# Patient Record
Sex: FEMALE | Race: WHITE | NOT HISPANIC OR LATINO | Employment: OTHER | URBAN - METROPOLITAN AREA
[De-identification: names, ages, dates, MRNs, and addresses within clinical notes are randomized per-mention and may not be internally consistent; named-entity substitution may affect disease eponyms.]

---

## 2017-01-11 ENCOUNTER — OFFICE VISIT (OUTPATIENT)
Dept: PULMONOLOGY | Facility: HOSPICE | Age: 72
End: 2017-01-11
Payer: MEDICARE

## 2017-01-11 VITALS
OXYGEN SATURATION: 97 % | WEIGHT: 140 LBS | BODY MASS INDEX: 28.22 KG/M2 | HEART RATE: 72 BPM | TEMPERATURE: 97.5 F | SYSTOLIC BLOOD PRESSURE: 120 MMHG | HEIGHT: 59 IN | RESPIRATION RATE: 16 BRPM | DIASTOLIC BLOOD PRESSURE: 80 MMHG

## 2017-01-11 VITALS — WEIGHT: 140 LBS | HEIGHT: 59 IN | BODY MASS INDEX: 28.22 KG/M2

## 2017-01-11 DIAGNOSIS — J45.30 REACTIVE AIRWAY DISEASE, MILD PERSISTENT, UNCOMPLICATED: ICD-10-CM

## 2017-01-11 DIAGNOSIS — J43.9 PULMONARY EMPHYSEMA, UNSPECIFIED EMPHYSEMA TYPE (HCC): ICD-10-CM

## 2017-01-11 DIAGNOSIS — R05.9 COUGH: ICD-10-CM

## 2017-01-11 PROCEDURE — 99213 OFFICE O/P EST LOW 20 MIN: CPT | Performed by: NURSE PRACTITIONER

## 2017-01-11 ASSESSMENT — PULMONARY FUNCTION TESTS
FEV1_PERCENT_PREDICTED: 104
FEV1/FVC_PERCENT_CHANGE: 50
FEV1_PERCENT_CHANGE: 2
FVC: 2.31
FEV1/FVC: 82.25
FEV1_PERCENT_PREDICTED: 102
FEV1_PERCENT_CHANGE: 1
FEV1/FVC_PERCENT_PREDICTED: 76
FEV1_PREDICTED: 1.82
FEV1: 1.86
FVC_PERCENT_PREDICTED: 96
FEV1/FVC_PERCENT_PREDICTED: 108
FEV1/FVC: 82
FVC: 2.27
FVC_PREDICTED: 2.41
FVC_PERCENT_PREDICTED: 94
FEV1/FVC_PERCENT_PREDICTED: 109
FEV1: 1.9

## 2017-01-11 NOTE — MR AVS SNAPSHOT
"Cait Pierre   2017 4:00 PM   Office Visit   MRN: [id number]    Department:  [facility]   Dept Phone:  [phone]    Description:  Female : 1945   Provider:  JOSTIN Mena           Reason for Visit     Follow-Up PFT Results      Allergies as of 2017     Allergen Noted Reactions    Levaquin 2012       Headache, Dizzy      You were diagnosed with     Cough   [786.2.ICD-9-CM]       Reactive airway disease, mild persistent, uncomplicated   [0798727]         Vital Signs     Blood Pressure Pulse Temperature Respirations Height Weight    120/80 mmHg 72 36.4 °C (97.5 °F) 16 1.499 m (4' 11.02\") 63.504 kg (140 lb)    Body Mass Index Oxygen Saturation Last Menstrual Period Smoking Status          28.26 kg/m2 97% 2001 Former Smoker        Basic Information     Date Of Birth Sex Race Ethnicity Preferred Language    1945 Female White Non- English      Your appointments     2017  9:20 AM   Established Patient with Birgit Gale M.D.   [facility] (--)    [address]  [address]  [facility] [address] [phone]           You will be receiving a confirmation call a few days before your appointment from our automated call confirmation system.            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   2016 - Present    Pulmonary emphysema (HCC) J43.9   2016 - Present    Cough R05   2016 - Present   " Reactive airway disease J45.909   11/29/2016 - Present      Health Maintenance        Date Due Completion Dates    IMM ZOSTER VACCINE 3/15/2007 ---    IMM INFLUENZA (1) 9/1/2016 10/12/2015 (Declined)    Override on 10/12/2015: Patient Declined    MAMMOGRAM 10/22/2016 10/22/2015, 10/14/2014, 10/8/2013, 10/8/2013 (Done), 5/29/2012, 5/29/2012 (N/S)    Override on 10/8/2013: Done    Override on 5/29/2012: (N/S)    BONE DENSITY 11/3/2020 11/3/2015, 12/31/2013, 5/29/2012, 5/29/2012 (N/S)    Override on 5/29/2012: (N/S)    IMM DTaP/Tdap/Td Vaccine (2 - Td) 6/8/2022 6/8/2012    COLONOSCOPY 9/17/2022 9/17/2012, 9/17/2012 (N/S)    Override on 9/17/2012: (N/S)            Current Immunizations     13-VALENT PCV PREVNAR 10/12/2015    Pneumococcal polysaccharide vaccine (PPSV-23) 6/8/2012    Tdap Vaccine 6/8/2012      Below and/or attached are the medications your provider expects you to take. Review all of your home medications and newly ordered medications with your provider and/or pharmacist. Follow medication instructions as directed by your provider and/or pharmacist. Please keep your medication list with you and share with your provider. Update the information when medications are discontinued, doses are changed, or new medications (including over-the-counter products) are added; and carry medication information at all times in the event of emergency situations     Allergies:  LEVAQUIN - (reactions not documented)               Medications  Valid as of: January 11, 2017 -  4:16 PM    Generic Name Brand Name Tablet Size Instructions for use    Albuterol Sulfate (Aero Soln) albuterol 108 (90 BASE) MCG/ACT Inhale 2 Puffs by mouth every 6 hours as needed for Shortness of Breath.        Amoxicillin (Recon Susp) AMOXIL 250 MG/5ML Take 10 mL by mouth 2 times a day.        Beclomethasone Dipropionate (Aero Soln) QVAR 80 MCG/ACT Inhale 2 Puffs by mouth 2 times a day. Use spacer. Rinse mouth after each use.         "Hydrocodone-Acetaminophen (Solution) HYCET 7.5-325 MG/15ML Take 14.7 mL by mouth every four hours as needed.        Hydrocortisone (Cream) PROCTOZONE HC 2.5 % Apply to perirectal area twice daily.        NON SPECIFIED   \"Antibiotics\" for strep        NON SPECIFIED   every day. Blue green algae        Probiotic Product   Take  by mouth every day.        Thyroid (Tab) ARMOUR THYROID 60 MG TAKE ONE TABLET BY MOUTH ONCE DAILY        .                 Medicines prescribed today were sent to:     [facility] [address] [place] [address] [address] [place], [place] [address] [facility] [address]    Phone: [phone] Fax: [phone]    Open 24 Hours?: No      Medication refill instructions:       If your prescription bottle indicates you have medication refills left, it is not necessary to call your provider’s office. Please contact your pharmacy and they will refill your medication.    If your prescription bottle indicates you do not have any refills left, you may request refills at any time through one of the following ways: The online Axxana system (except Urgent Care), by calling your provider’s office, or by asking your pharmacy to contact your provider’s office with a refill request. Medication refills are processed only during regular business hours and may not be available until the next business day. Your provider may request additional information or to have a follow-up visit with you prior to refilling your medication.   *Please Note: Medication refills are assigned a new Rx number when refilled electronically. Your pharmacy may indicate that no refills were authorized even though a new prescription for the same medication is available at the pharmacy. Please request the medicine by name with the pharmacy before contacting your provider for a refill.        Instructions    1. Stop Spiriva.  2. [place] Qvar 80 µg 2 inhalations twice daily with spacer. Rinse mouth thoroughly after use.  3. Continue " Ventolin 2 puffs every 4 hours as needed for cough.         Other Notes About Your Plan     Cait is a R-Med Team patient of Dr. Gonzalez.    This appointment is 3-7-16. It is the beginning of the year and will require all chronic conditions to be assessed and documented in conjunction with any other identified concerns during the visit:    No Query           Chewse Access Code: [email or web address]  Expires: 1/17/2017  8:14 AM    Chewse  A secure, online tool to manage your health information     [employer]’s Chewse® is a secure, online tool that connects you to your personalized health information from the privacy of your home -- day or night - making it very easy for you to manage your healthcare. Once the activation process is completed, you can even access your medical information using the Chewse debra, which is available for free in the Apple Debra store or Google Play store.     Chewse provides the following levels of access (as shown below):   My Chart Features   [facility] Primary Care Doctor [facility]  Specialists [facility]  Urgent  Care [facility]  Primary Care  Doctor   Email your healthcare team securely and privately 24/7 X X X    Manage appointments: schedule your next appointment; view details of past/upcoming appointments X      Request prescription refills. X      View recent personal medical records, including lab and immunizations X X X X   View health record, including health history, allergies, medications X X X X   Read reports about your outpatient visits, procedures, consult and ER notes X X X X   See your discharge summary, which is a recap of your hospital and/or ER visit that includes your diagnosis, lab results, and care plan. X X       How to register for Chewse:  1. Go to  [email or web address].  2. Click on the Sign Up Now box, which takes you to the New Member Sign Up page. You will need to provide the following information:  a. Enter your Chewse Access Code exactly as it appears at the  top of this page. (You will not need to use this code after you’ve completed the sign-up process. If you do not sign up before the expiration date, you must request a new code.)   b. Enter your date of birth.   c. Enter your home email address.   d. Click Submit, and follow the next screen’s instructions.  3. Create a HYLA Mobile ID. This will be your HYLA Mobile login ID and cannot be changed, so think of one that is secure and easy to remember.  4. Create a HYLA Mobile password. You can change your password at any time.  5. Enter your Password Reset Question and Answer. This can be used at a later time if you forget your password.   6. Enter your e-mail address. This allows you to receive e-mail notifications when new information is available in HYLA Mobile.  7. Click Sign Up. You can now view your health information.    For assistance activating your HYLA Mobile account, call [phone]

## 2017-01-11 NOTE — MR AVS SNAPSHOT
"Cait SalamancaBrennaSteven   2017 3:00 PM   Office Visit   MRN: [id number]    Department:  [facility]   Dept Phone:  [phone]    Description:  Female : 1945   Provider:  [id number]           Allergies as of 2017     Allergen Noted Reactions    Levaquin 2012       Headache, Dizzy      You were diagnosed with     Cough   [786.2.ICD-9-CM]       Reactive airway disease, mild persistent, uncomplicated   [7047222]       Pulmonary emphysema, unspecified emphysema type (HCC)   [7183307]         Vital Signs     Height Weight Body Mass Index Last Menstrual Period Smoking Status       1.499 m (4' 11\") 63.504 kg (140 lb) 28.26 kg/m2 2001 Former Smoker       Basic Information     Date Of Birth Sex Race Ethnicity Preferred Language    1945 Female White Non- English      Your appointments     2017  4:00 PM   Established Patient Pul with JOSTIN Mena   [facility] (--)    [address] [place] [id number] [phone]            2017  9:20 AM   Established Patient with Birgit Gale M.D.   [facility] (--)    [address]  [address]  [facility] [address] [phone]           You will be receiving a confirmation call a few days before your appointment from our automated call confirmation system.            Mar 02, 2017  9:20 AM   XRAY 15 with PULMONARY DX 1   [facility] ([address])    [address] [place] [id number]               Mar 02, 2017 10:00 AM   Pulmonary Function Test with PFT-RM2   [facility] (--)    [address] [place] [id number] [phone]              Problem List              ICD-10-CM Priority Class Noted - Resolved    Hyperlipidemia with target LDL less than 130 E78.5   2012 - Present    Osteopenia M85.80   2012 - Present    H/O partial thyroidectomy E89.0   Unknown - Present    Hypothyroidism E03.9   " 12/18/2012 - Present    Multiple thyroid nodules E04.2   Unknown - Present    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   9/9/2016 - Present    Pulmonary emphysema (HCC) J43.9   11/29/2016 - Present    Cough R05   11/29/2016 - Present    Reactive airway disease J45.909   11/29/2016 - Present      Health Maintenance        Date Due Completion Dates    IMM ZOSTER VACCINE 3/15/2007 ---    IMM INFLUENZA (1) 9/1/2016 10/12/2015 (Declined)    Override on 10/12/2015: Patient Declined    MAMMOGRAM 10/22/2016 10/22/2015, 10/14/2014, 10/8/2013, 10/8/2013 (Done), 5/29/2012, 5/29/2012 (N/S)    Override on 10/8/2013: Done    Override on 5/29/2012: (N/S)    BONE DENSITY 11/3/2020 11/3/2015, 12/31/2013, 5/29/2012, 5/29/2012 (N/S)    Override on 5/29/2012: (N/S)    IMM DTaP/Tdap/Td Vaccine (2 - Td) 6/8/2022 6/8/2012    COLONOSCOPY 9/17/2022 9/17/2012, 9/17/2012 (N/S)    Override on 9/17/2012: (N/S)            Current Immunizations     13-VALENT PCV PREVNAR 10/12/2015    Pneumococcal polysaccharide vaccine (PPSV-23) 6/8/2012    Tdap Vaccine 6/8/2012      Below and/or attached are the medications your provider expects you to take. Review all of your home medications and newly ordered medications with your provider and/or pharmacist. Follow medication instructions as directed by your provider and/or pharmacist. Please keep your medication list with you and share with your provider. Update the information when medications are discontinued, doses are changed, or new medications (including over-the-counter products) are added; and carry medication information at all times in the event of emergency situations     Allergies:  LEVAQUIN - (reactions not documented)               Medications  Valid as of: January 11, 2017 -  3:39 PM    Generic Name Brand Name Tablet Size Instructions for use    Albuterol Sulfate (Aero Soln) albuterol 108 (90 BASE) MCG/ACT Inhale 2 Puffs by mouth every 6 hours as needed for Shortness of Breath.        Amoxicillin (Recon Susp)  "AMOXIL 250 MG/5ML Take 10 mL by mouth 2 times a day.        Hydrocodone-Acetaminophen (Solution) HYCET 7.5-325 MG/15ML Take 14.7 mL by mouth every four hours as needed.        Hydrocortisone (Cream) PROCTOZONE HC 2.5 % Apply to perirectal area twice daily.        NON SPECIFIED   \"Antibiotics\" for strep        NON SPECIFIED   every day. Blue green algae        Probiotic Product   Take  by mouth every day.        Thyroid (Tab) ARMOUR THYROID 60 MG TAKE ONE TABLET BY MOUTH ONCE DAILY        Tiotropium Bromide Monohydrate (Cap) SPIRIVA 18 MCG Use 1 capsule inhaled from handihaler every day.        .                 Medicines prescribed today were sent to:     [facility] [address] [place] [address] [address] [place] [address]    Phone: [phone] Fax: [phone]    Open 24 Hours?: No      Medication refill instructions:       If your prescription bottle indicates you have medication refills left, it is not necessary to call your provider’s office. Please contact your pharmacy and they will refill your medication.    If your prescription bottle indicates you do not have any refills left, you may request refills at any time through one of the following ways: The online Streaming Era system (except Urgent Care), by calling your provider’s office, or by asking your pharmacy to contact your provider’s office with a refill request. Medication refills are processed only during regular business hours and may not be available until the next business day. Your provider may request additional information or to have a follow-up visit with you prior to refilling your medication.   *Please Note: Medication refills are assigned a new Rx number when refilled electronically. Your pharmacy may indicate that no refills were authorized even though a new prescription for the same medication is available at the pharmacy. Please request the medicine by name with the pharmacy before contacting your provider for " a refill.        Other Notes About Your Plan     Cait is a R-Med Team patient of Dr. Gonzalez.    This appointment is 3-7-16. It is the beginning of the year and will require all chronic conditions to be assessed and documented in conjunction with any other identified concerns during the visit:    No Query           Adstrix Access Code: [email or web address]  Expires: 1/17/2017  8:14 AM    Adstrix  A secure, online tool to manage your health information     [employer]’s Adstrix® is a secure, online tool that connects you to your personalized health information from the privacy of your home -- day or night - making it very easy for you to manage your healthcare. Once the activation process is completed, you can even access your medical information using the Adstrix debra, which is available for free in the Apple Debra store or Google Play store.     Adstrix provides the following levels of access (as shown below):   My Chart Features   [facility] Primary Care Doctor [facility]  Specialists [facility]  Urgent  Care [facility]  Primary Care  Doctor   Email your healthcare team securely and privately 24/7 X X X    Manage appointments: schedule your next appointment; view details of past/upcoming appointments X      Request prescription refills. X      View recent personal medical records, including lab and immunizations X X X X   View health record, including health history, allergies, medications X X X X   Read reports about your outpatient visits, procedures, consult and ER notes X X X X   See your discharge summary, which is a recap of your hospital and/or ER visit that includes your diagnosis, lab results, and care plan. X X       How to register for Adstrix:  1. Go to  [email or web address].  2. Click on the Sign Up Now box, which takes you to the New Member Sign Up page. You will need to provide the following information:  a. Enter your Adstrix Access Code exactly as it appears at the top of this page. (You will not need to use  this code after you’ve completed the sign-up process. If you do not sign up before the expiration date, you must request a new code.)   b. Enter your date of birth.   c. Enter your home email address.   d. Click Submit, and follow the next screen’s instructions.  3. Create a GradeBeamt ID. This will be your GradeBeamt login ID and cannot be changed, so think of one that is secure and easy to remember.  4. Create a GradeBeamt password. You can change your password at any time.  5. Enter your Password Reset Question and Answer. This can be used at a later time if you forget your password.   6. Enter your e-mail address. This allows you to receive e-mail notifications when new information is available in ShopSuey.  7. Click Sign Up. You can now view your health information.    For assistance activating your ShopSuey account, call [phone]

## 2017-01-11 NOTE — PROCEDURES
Technician: CHRIS Eagle    Technician Comment:  Good patient effort & cooperation.  The results of this test meet the ATS/ERS standards for acceptability & reproducibility.  Test was performed on the PowerOne Media Body Plethysmograph-Elite DX system.  Predicted values were [facility]-3 for spirometry, [facility] for DLCO, ITS for Lung Volumes.  The DLCO was uncorrected for Hgb.  A bronchodilator of Ventolin HFA -2puffs via spacer administered.    Interpretation:  SPIROMETRY:  1. FVC was 94% of predicted  2. FEV1 was 102% of predicted  3. FEV1/FVC ratio was 82%  4. There was no response to bronchodilators  LUNG VOLUMES:  1. Residual volume was 157% of predicted. Total lung capacity was 114% of predicted      DIFFUSION CAPACITY:  1. Diffusion capacity was mildly increased at 134% predicted      IMPRESSION:  Other than mild air trapping, and mild increase in diffusion capacity, probably secondary to that. The patient has normal pulmonary function test. Clinical correlation is required.

## 2017-01-11 NOTE — PROGRESS NOTES
Chief Complaint   Patient presents with   • Follow-Up     PFT Results         HPI:  This is a 71 y.o. female with a history of asthma and chronic cough. Pulmonary function tests from 1/11/17 indicate FEV1 1.86 L, 102% predicted, FEV1/FVC 82%, FEF 25-75% 120% predicted, and DLCO 134% predicted. The patient is compliant with Spiriva one inhalation daily. Chest x-ray from 11/3/16 indicates possible emphysema. The patient has a remote smoking history and quit in 1987 with significant secondhand smoke as she worked in [employer] for 30 years. The patient reports that her cough is worse with cold weather. She also notices with her first bite of food sometimes she has a coughing spasm, however it is not every time. The patient was using Ventolin twice daily and felt it was very effective in treating her cough. At her last visit she started Spiriva one time daily and used Ventolin one time daily and felt her cough worsened. She reports no dyspnea, fever, chills, hemoptysis, or wheezing.    Past Medical History   Diagnosis Date   • Hyperlipidemia    • Osteopenia    • H/O partial thyroidectomy 2012      L lobectomy / nodular hyperplasia   • Hypothyroidism 12/18/2012     TSH = 13.7 / 2014   • Multiple thyroid nodules      right thyroid lobe / 2 nodules   • Dental disorder      upper dentures    • Bronchitis      as a child   • Anesthesia      PONV    • Cold 8/3/16     Strep    • Tonsillitis        Past Surgical History   Procedure Laterality Date   • Other orthopedic surgery  1977     Partial Finger tip amputation from accident   • Colonoscopy  9/12   • Thyroidectomy  9/28/2012     Performed by Brent Silva M.D. at SURGERY SAME DAY [facility] ORS   • Microlaryngoscopy N/A 9/9/2016     Procedure: MICROLARYNGOSCOPY - DIRECT;  Surgeon: Brent Silva M.D.;  Location: SURGERY SAME DAY [facility];  Service:    • Vocal cord stripping Right 9/9/2016     Procedure: VOCAL CORD STRIPPING - TRUE;  Surgeon: Brent Silva M.D.;  " Location: SURGERY SAME DAY [facility];  Service:    • Lesion excision general Left 9/9/2016     Procedure: LESION EXCISION GENERAL - TONGUE BASE;  Surgeon: Brent Silva M.D.;  Location: SURGERY SAME DAY [facility];  Service:        Social History   Substance Use Topics   • Smoking status: Former Smoker -- 0.50 packs/day for 26 years     Types: Cigarettes     Quit date: 01/01/1986   • Smokeless tobacco: Never Used   • Alcohol Use: 0.6 oz/week     1 Cans of beer per week       ROS:   Constitutional: Denies fevers, chills, sweats, fatigue, and weight loss.  Eyes: Glasses.  Ears/nose/mouth/throat: Denies injury.  Cardiovascular: Denies chest pain, tightness.  Respiratory: See history of present illness.  GI: Denies heartburn, difficulty swallowing, nausea, and vomiting.  Neurological: Denies frequent headaches, dizziness, weakness.    Vitals:  Filed Vitals:    01/11/17 1542   Height: 1.499 m (4' 11.02\")   Weight: 63.504 kg (140 lb)   Weight % change since last entry.: 0 %   BP: 120/80   Pulse: 72   BMI (Calculated): 28.26   Resp: 16   Temp: 36.4 °C (97.5 °F)   O2 sat % room air: 97 %       Allergies:  Levaquin    Medications:  Current Outpatient Prescriptions   Medication Sig Dispense Refill   • beclomethasone (QVAR) 80 MCG/ACT inhaler Inhale 2 Puffs by mouth 2 times a day. Use spacer. Rinse mouth after each use. 1 Inhaler 5   • albuterol 108 (90 BASE) MCG/ACT Aero Soln inhalation aerosol Inhale 2 Puffs by mouth every 6 hours as needed for Shortness of Breath. 8.5 g 2   • hydrocortisone rectal (ANUSOL-HC) 2.5 % Cream Apply to perirectal area twice daily. 30 g 1   • Probiotic Product (PROBIOTIC DAILY PO) Take  by mouth every day.     • ARMOUR THYROID 60 MG Tab TAKE ONE TABLET BY MOUTH ONCE DAILY 30 Tab 6   • amoxicillin (AMOXIL) 250 MG/5ML Recon Susp Take 10 mL by mouth 2 times a day. 200 mL 0   • hydrocodone-acetaminophen 2.5-108 mg/5mL (HYCET) 7.5-325 MG/15ML solution Take 14.7 mL by mouth every four hours " "as needed. (Patient not taking: Reported on 11/29/2016) 500 mL 0   • NON SPECIFIED \"Antibiotics\" for strep     • NON SPECIFIED every day. Blue green algae       No current facility-administered medications for this visit.       PHYSICAL EXAM:  Appearance: Well-developed, well-nourished, no acute distress.  Eyes. PERRL.  Hearing: Grossly intact.  Oropharynx: Tongue normal, posterior pharynx without erythema or exudate.  Respiratory effort: No intercostal retractions or use of accessory muscles.  Lung auscultation: No crackles, wheezing.  Heart auscultation: No murmur, gallop, or rub. Regular rate and rhythm.  Extremities: No cyanosis or edema.  Gait and Station: Normal  Orientation: Oriented to time, place, and person.    Assessment:  1. Cough  beclomethasone (QVAR) 80 MCG/ACT inhaler   2. Reactive airway disease, mild persistent, uncomplicated  beclomethasone (QVAR) 80 MCG/ACT inhaler         Plan:  1. Stop Spiriva.  2. [place] Qvar 80 µg 2 inhalations twice daily with spacer. Rinse mouth thoroughly after use.  3. Continue Ventolin 2 puffs every 4 hours as needed for cough.    Return in about 2 months (around 3/11/2017) for With SHYANNE Colon.        "

## 2017-01-12 NOTE — PATIENT INSTRUCTIONS
1. Stop Spiriva.  2. [place] Qvar 80 µg 2 inhalations twice daily with spacer. Rinse mouth thoroughly after use.  3. Continue Ventolin 2 puffs every 4 hours as needed for cough.

## 2017-01-16 ENCOUNTER — OFFICE VISIT (OUTPATIENT)
Dept: MEDICAL GROUP | Facility: PHYSICIAN GROUP | Age: 72
End: 2017-01-16
Payer: MEDICARE

## 2017-01-16 VITALS
SYSTOLIC BLOOD PRESSURE: 120 MMHG | OXYGEN SATURATION: 99 % | DIASTOLIC BLOOD PRESSURE: 72 MMHG | HEIGHT: 59 IN | RESPIRATION RATE: 16 BRPM | WEIGHT: 142 LBS | BODY MASS INDEX: 28.63 KG/M2 | HEART RATE: 97 BPM | TEMPERATURE: 98.6 F

## 2017-01-16 DIAGNOSIS — R73.01 IMPAIRED FASTING BLOOD SUGAR: ICD-10-CM

## 2017-01-16 DIAGNOSIS — R05.3 CHRONIC COUGH: ICD-10-CM

## 2017-01-16 DIAGNOSIS — M85.80 OSTEOPENIA: ICD-10-CM

## 2017-01-16 DIAGNOSIS — E78.5 DYSLIPIDEMIA: ICD-10-CM

## 2017-01-16 DIAGNOSIS — E03.9 HYPOTHYROIDISM, UNSPECIFIED TYPE: ICD-10-CM

## 2017-01-16 PROCEDURE — 99214 OFFICE O/P EST MOD 30 MIN: CPT | Performed by: FAMILY MEDICINE

## 2017-01-16 NOTE — PROGRESS NOTES
"Subjective:      Cait Pierre is a 71 y.o. female who presents with Follow-Up            HPI     Patient returns for follow-up of her medical problems. She is accompanied by her .    In terms of her chronic cough, associated evaluated by the pulmonologist. She had pulmonary function tests that came back normal. She was diagnosed with mild persistent reactive airway disease. Spiriva was added to her albuterol HFA. Spiriva was later switched to Qvar because patient thought that the inhaler caused increased her symptoms. On questioning today she did not remember having issues with the Spiriva. She did not start the Qvar because she is worried about potential side effects. She thought that their pet cat may be causing her symptoms so patient gave up the cat. She has noticed some improvement with her coughing. She continues to use the albuterol inhaler one time a day only. She said she has a follow-up appointment with her ENT specialist.    She continues to take thyroid replacement for hypothyroidism. She had a thyroid lobectomy for thyroid nodules which came back benign. She is followed by Dr. Neal. Her last thyroid function tests were done in 7/16 which came back adequately replaced. She is on armor thyroid.    She continues to take red yeast rice only for dyslipidemia. She needs follow-up blood work.    She continues to manage her impaired Fasting blood sugar by watching the diet.    She takes calcium and vitamin D supplementation for osteopenia. The last bone density scan was in 11/15.    Past medical history, past surgical history, family history reviewed-no changes    Social history reviewed-no changes    Allergies reviewed-no changes    Medications reviewed-no changes    ROS     Review of systems as per history of present illness, the rest are negative.     Objective:     /72 mmHg  Pulse 97  Temp(Src) 37 °C (98.6 °F)  Resp 16  Ht 1.499 m (4' 11\")  Wt 64.411 kg (142 lb)  BMI 28.67 " kg/m2  SpO2 99%  LMP 01/01/2001     Physical Exam     Examined alert, awake, oriented, not in distress    Neck-supple, no lymphadenopathy, no thyromegaly  Lungs-clear to auscultation, no rales, no wheezes  Heart-regular rate and rhythm, no murmur  Extremities-no edema, clubbing, cyanosis          Assessment/Plan:     1. Chronic cough  This is better compared to before. The pet cat may have contributed to the problem. Advised to start back on Spiriva if she did not have any problems from this inhaler. She can use this daily together with albuterol HFA. She has a follow-up appointment with pulmonology group in March.  - LIPID PROFILE; Future  - COMP METABOLIC PANEL; Future  - HEMOGLOBIN A1C; Future  - VITAMIN D,25 HYDROXY; Future  - CBC WITH DIFFERENTIAL; Future    2. Hypothyroidism, unspecified type  She will schedule follow-up appointment with Dr. Neal. Continue thyroid replacement.  - LIPID PROFILE; Future  - COMP METABOLIC PANEL; Future  - HEMOGLOBIN A1C; Future  - VITAMIN D,25 HYDROXY; Future  - CBC WITH DIFFERENTIAL; Future    3. Dyslipidemia  We will do follow-up cholesterol panel. We will treat with statin depending on her 10 year cardiovascular disease risk. She was made aware of the guidelines for treatment with statin.  - LIPID PROFILE; Future  - COMP METABOLIC PANEL; Future  - HEMOGLOBIN A1C; Future  - VITAMIN D,25 HYDROXY; Future  - CBC WITH DIFFERENTIAL; Future    4. Osteopenia  Continue calcium and vitamin D supplementation. We will check vitamin D level. The last bone density scan was in 11/15.  - LIPID PROFILE; Future  - COMP METABOLIC PANEL; Future  - HEMOGLOBIN A1C; Future  - VITAMIN D,25 HYDROXY; Future  - CBC WITH DIFFERENTIAL; Future    5. Impaired fasting blood sugar   We will do follow-up fasting blood sugar and hemoglobin A1c. Continue to manage this with her own efforts.  - LIPID PROFILE; Future  - COMP METABOLIC PANEL; Future  - HEMOGLOBIN A1C; Future  - VITAMIN D,25 HYDROXY; Future  -  CBC WITH DIFFERENTIAL; Future        Please note that this dictation was created using voice recognition software. I have worked with consultants from the vendor as well as technical experts from [facility] to optimize the interface. I have made every reasonable attempt to correct obvious errors, but I expect that there are errors of grammar and possibly content I did not discover before finalizing the note.

## 2017-01-16 NOTE — MR AVS SNAPSHOT
"Cait Pierre   2017 9:20 AM   Office Visit   MRN: [id number]    Department:  [facility]   Dept Phone:  [phone]    Description:  Female : 1945   Provider:  Birgit Gale M.D.           Reason for Visit     Follow-Up 6 month follow up       Allergies as of 2017     Allergen Noted Reactions    Levaquin 2012       Headache, Dizzy      You were diagnosed with     Chronic cough   [391035]       Hypothyroidism, unspecified type   [7718773]       Dyslipidemia   [793916]       Osteopenia   [928469]       Impaired fasting blood sugar   [710148]         Vital Signs     Blood Pressure Pulse Temperature Respirations Height Weight    120/72 mmHg 97 37 °C (98.6 °F) 16 1.499 m (4' 11\") 64.411 kg (142 lb)    Body Mass Index Oxygen Saturation Last Menstrual Period Smoking Status          28.67 kg/m2 99% 2001 Former Smoker        Basic Information     Date Of Birth Sex Race Ethnicity Preferred Language    1945 Female White Non- English      Your appointments     Mar 17, 2017  3:00 PM   Established Patient Pul with JOSTIN Mena   [facility] (--)    [address]  [address]  [facility] [address] [phone]            2017  3:20 PM   Established Patient with Birgit Gale M.D.   [facility] (--)    [address]  [address]  [facility] [address] [phone]           You will be receiving a confirmation call a few days before your appointment from our automated call confirmation system.              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   2016 - Present    Pulmonary emphysema " (CMS-HCC) J43.9   11/29/2016 - Present    Cough R05   11/29/2016 - Present    Reactive airway disease J45.909   11/29/2016 - Present      Health Maintenance        Date Due Completion Dates    IMM ZOSTER VACCINE 3/15/2007 ---    MAMMOGRAM 10/22/2016 10/22/2015, 10/14/2014, 10/8/2013, 10/8/2013 (Done), 5/29/2012, 5/29/2012 (N/S)    Override on 10/8/2013: Done    Override on 5/29/2012: (N/S)    IMM INFLUENZA (1) 1/16/2018 (Originally 9/1/2016) 10/12/2015 (Declined)    Override on 10/12/2015: Patient Declined    BONE DENSITY 11/3/2020 11/3/2015, 12/31/2013, 5/29/2012, 5/29/2012 (N/S)    Override on 5/29/2012: (N/S)    IMM DTaP/Tdap/Td Vaccine (2 - Td) 6/8/2022 6/8/2012    COLONOSCOPY 9/17/2022 9/17/2012, 9/17/2012 (N/S)    Override on 9/17/2012: (N/S)            Current Immunizations     13-VALENT PCV PREVNAR 10/12/2015    Pneumococcal polysaccharide vaccine (PPSV-23) 6/8/2012    Tdap Vaccine 6/8/2012      Below and/or attached are the medications your provider expects you to take. Review all of your home medications and newly ordered medications with your provider and/or pharmacist. Follow medication instructions as directed by your provider and/or pharmacist. Please keep your medication list with you and share with your provider. Update the information when medications are discontinued, doses are changed, or new medications (including over-the-counter products) are added; and carry medication information at all times in the event of emergency situations     Allergies:  LEVAQUIN - (reactions not documented)               Medications  Valid as of: January 16, 2017 -  5:09 PM    Generic Name Brand Name Tablet Size Instructions for use    Albuterol Sulfate (Aero Soln) albuterol 108 (90 BASE) MCG/ACT Inhale 2 Puffs by mouth every 6 hours as needed for Shortness of Breath.        Amoxicillin (Recon Susp) AMOXIL 250 MG/5ML Take 10 mL by mouth 2 times a day.        Beclomethasone Dipropionate (Aero Soln) QVAR 80 MCG/ACT  "Inhale 2 Puffs by mouth 2 times a day. Use spacer. Rinse mouth after each use.        Hydrocodone-Acetaminophen (Solution) HYCET 7.5-325 MG/15ML Take 14.7 mL by mouth every four hours as needed.        Hydrocortisone (Cream) PROCTOZONE HC 2.5 % Apply to perirectal area twice daily.        NON SPECIFIED   \"Antibiotics\" for strep        NON SPECIFIED   every day. Blue green algae        Probiotic Product   Take  by mouth every day.        Thyroid (Tab) ARMOUR THYROID 60 MG TAKE ONE TABLET BY MOUTH ONCE DAILY        .                 Medicines prescribed today were sent to:     [facility] [address] [place] [address] [address] [address]    Phone: [phone] Fax: [phone]    Open 24 Hours?: No      Medication refill instructions:       If your prescription bottle indicates you have medication refills left, it is not necessary to call your provider’s office. Please contact your pharmacy and they will refill your medication.    If your prescription bottle indicates you do not have any refills left, you may request refills at any time through one of the following ways: The online [email or web address] system (except Urgent Care), by calling your provider’s office, or by asking your pharmacy to contact your provider’s office with a refill request. Medication refills are processed only during regular business hours and may not be available until the next business day. Your provider may request additional information or to have a follow-up visit with you prior to refilling your medication.   *Please Note: Medication refills are assigned a new Rx number when refilled electronically. Your pharmacy may indicate that no refills were authorized even though a new prescription for the same medication is available at the pharmacy. Please request the medicine by name with the pharmacy before contacting your provider for a refill.        Your To Do List     Future Labs/Procedures Complete By Expires   "    CBC WITH DIFFERENTIAL  As directed 1/17/2018    COMP METABOLIC PANEL  As directed 1/17/2018    HEMOGLOBIN A1C  As directed 1/17/2018    LIPID PROFILE  As directed 1/17/2018    VITAMIN D,25 HYDROXY  As directed 1/17/2018      Instructions    Patient instructions given regarding labs, x-rays, medications, referral and followup appointment.       Other Notes About Your Plan     Cait is a [facility] patient of Dr. Gonzalez.    This appointment is 3-7-16. It is the beginning of the year and will require all chronic conditions to be assessed and documented in conjunction with any other identified concerns during the visit:    No Query           The Society Access Code: [email or web address]  Expires: 1/17/2017  8:14 AM    The Society  A secure, online tool to manage your health information     [employer]’s The Society® is a secure, online tool that connects you to your personalized health information from the privacy of your home -- day or night - making it very easy for you to manage your healthcare. Once the activation process is completed, you can even access your medical information using the The Society debra, which is available for free in the Apple Debra store or Google Play store.     The Society provides the following levels of access (as shown below):   My Chart Features   [facility] Doctor [facility]  Urgent  Care Non-[facility]  Primary Care  Doctor   Email your healthcare team securely and privately 24/7 X X X    Manage appointments: schedule your next appointment; view details of past/upcoming appointments X      Request prescription refills. X      View recent personal medical records, including lab and immunizations X X X X   View health record, including health history, allergies, medications X X X X   Read reports about your outpatient visits, procedures, consult and ER notes X X X X   See your discharge summary, which is a recap of your hospital and/or ER visit that includes your diagnosis, lab results,  and care plan. X X       How to register for Innotrieve:  1. Go to  [email or web address].  2. Click on the Sign Up Now box, which takes you to the New Member Sign Up page. You will need to provide the following information:  a. Enter your Innotrieve Access Code exactly as it appears at the top of this page. (You will not need to use this code after you’ve completed the sign-up process. If you do not sign up before the expiration date, you must request a new code.)   b. Enter your date of birth.   c. Enter your home email address.   d. Click Submit, and follow the next screen’s instructions.  3. Create a Innotrieve ID. This will be your Innotrieve login ID and cannot be changed, so think of one that is secure and easy to remember.  4. Create a [employer] password. You can change your password at any time.  5. Enter your Password Reset Question and Answer. This can be used at a later time if you forget your password.   6. Enter your e-mail address. This allows you to receive e-mail notifications when new information is available in Innotrieve.  7. Click Sign Up. You can now view your health information.    For assistance activating your Innotrieve account, call [phone]

## 2017-02-10 ENCOUNTER — HOSPITAL ENCOUNTER (OUTPATIENT)
Dept: RADIOLOGY | Facility: MEDICAL CENTER | Age: 72
End: 2017-02-10
Attending: FAMILY MEDICINE
Payer: MEDICARE

## 2017-02-10 DIAGNOSIS — Z12.31 SCREENING MAMMOGRAM, ENCOUNTER FOR: ICD-10-CM

## 2017-02-10 PROCEDURE — 77063 BREAST TOMOSYNTHESIS BI: CPT

## 2017-02-13 ENCOUNTER — TELEPHONE (OUTPATIENT)
Dept: MEDICAL GROUP | Facility: PHYSICIAN GROUP | Age: 72
End: 2017-02-13

## 2017-02-13 NOTE — Clinical Note
February 13, 2017        [facility]  [address] [facility] [address]        Dear Cait    Here are the results of your test(s):         Birgit Gale M.D.  P [facility] Licea Ma                   Mammogram within normal limits.             Sincerely,        Nelli Hernandez  Signed Electronically

## 2017-02-13 NOTE — TELEPHONE ENCOUNTER
1. Caller Name: Cait Pierre                                         Call Back Number: [phone] (home)     2. Message: Sent letter to notify patient of results    3. Patient approves office to leave a detailed voicemail/MyChart message: PADMINI Doran Ma                   Mammogram within normal limits.

## 2017-02-21 RX ORDER — THYROID 60 MG
TABLET ORAL
Qty: 30 TAB | Refills: 2 | Status: SHIPPED | OUTPATIENT
Start: 2017-02-21 | End: 2017-11-09 | Stop reason: SDUPTHER

## 2017-02-21 RX ORDER — THYROID 60 MG
TABLET ORAL
Qty: 30 TAB | Refills: 4 | Status: SHIPPED | OUTPATIENT
Start: 2017-02-21 | End: 2018-04-10

## 2017-03-07 ENCOUNTER — HOSPITAL ENCOUNTER (OUTPATIENT)
Dept: LAB | Facility: MEDICAL CENTER | Age: 72
End: 2017-03-07
Attending: FAMILY MEDICINE
Payer: MEDICARE

## 2017-03-07 DIAGNOSIS — E03.9 HYPOTHYROIDISM, UNSPECIFIED TYPE: ICD-10-CM

## 2017-03-07 DIAGNOSIS — R73.01 IMPAIRED FASTING BLOOD SUGAR: ICD-10-CM

## 2017-03-07 DIAGNOSIS — R05.3 CHRONIC COUGH: ICD-10-CM

## 2017-03-07 DIAGNOSIS — M85.80 OSTEOPENIA: ICD-10-CM

## 2017-03-07 DIAGNOSIS — E78.5 DYSLIPIDEMIA: ICD-10-CM

## 2017-03-07 LAB
25(OH)D3 SERPL-MCNC: 32 NG/ML (ref 30–100)
ALBUMIN SERPL BCP-MCNC: 4.3 G/DL (ref 3.2–4.9)
ALBUMIN/GLOB SERPL: 1.3 G/DL
ALP SERPL-CCNC: 108 U/L (ref 30–99)
ALT SERPL-CCNC: 17 U/L (ref 2–50)
ANION GAP SERPL CALC-SCNC: 7 MMOL/L (ref 0–11.9)
AST SERPL-CCNC: 21 U/L (ref 12–45)
BASOPHILS # BLD AUTO: 0.07 K/UL (ref 0–0.12)
BASOPHILS NFR BLD AUTO: 0.8 % (ref 0–1.8)
BILIRUB SERPL-MCNC: 0.4 MG/DL (ref 0.1–1.5)
BUN SERPL-MCNC: 15 MG/DL (ref 8–22)
CALCIUM SERPL-MCNC: 10.1 MG/DL (ref 8.5–10.5)
CHLORIDE SERPL-SCNC: 99 MMOL/L (ref 96–112)
CHOLEST SERPL-MCNC: 250 MG/DL (ref 100–199)
CO2 SERPL-SCNC: 25 MMOL/L (ref 20–33)
CREAT SERPL-MCNC: 0.88 MG/DL (ref 0.5–1.4)
EOSINOPHIL # BLD: 0.09 K/UL (ref 0–0.51)
EOSINOPHIL NFR BLD AUTO: 1 % (ref 0–6.9)
ERYTHROCYTE [DISTWIDTH] IN BLOOD BY AUTOMATED COUNT: 46.2 FL (ref 35.9–50)
EST. AVERAGE GLUCOSE BLD GHB EST-MCNC: 123 MG/DL
GLOBULIN SER CALC-MCNC: 3.4 G/DL (ref 1.9–3.5)
GLUCOSE SERPL-MCNC: 81 MG/DL (ref 65–99)
HBA1C MFR BLD: 5.9 % (ref 0–5.6)
HCT VFR BLD AUTO: 41.7 % (ref 37–47)
HDLC SERPL-MCNC: 48 MG/DL
HGB BLD-MCNC: 13.6 G/DL (ref 12–16)
IMM GRANULOCYTES # BLD AUTO: 0.03 K/UL (ref 0–0.11)
IMM GRANULOCYTES NFR BLD AUTO: 0.3 % (ref 0–0.9)
LDLC SERPL CALC-MCNC: 174 MG/DL
LYMPHOCYTES # BLD: 1.76 K/UL (ref 1–4.8)
LYMPHOCYTES NFR BLD AUTO: 20.1 % (ref 22–41)
MCH RBC QN AUTO: 31.1 PG (ref 27–33)
MCHC RBC AUTO-ENTMCNC: 32.6 G/DL (ref 33.6–35)
MCV RBC AUTO: 95.2 FL (ref 81.4–97.8)
MONOCYTES # BLD: 0.59 K/UL (ref 0–0.85)
MONOCYTES NFR BLD AUTO: 6.7 % (ref 0–13.4)
NEUTROPHILS # BLD: 6.23 K/UL (ref 2–7.15)
NEUTROPHILS NFR BLD AUTO: 71.1 % (ref 44–72)
NRBC # BLD AUTO: 0 K/UL
NRBC BLD-RTO: 0 /100 WBC
PLATELET # BLD AUTO: 359 K/UL (ref 164–446)
PMV BLD AUTO: 9.8 FL (ref 9–12.9)
POTASSIUM SERPL-SCNC: 4.4 MMOL/L (ref 3.6–5.5)
PROT SERPL-MCNC: 7.7 G/DL (ref 6–8.2)
RBC # BLD AUTO: 4.38 M/UL (ref 4.2–5.4)
SODIUM SERPL-SCNC: 131 MMOL/L (ref 135–145)
TRIGL SERPL-MCNC: 139 MG/DL (ref 0–149)
WBC # BLD AUTO: 8.8 K/UL (ref 4.8–10.8)

## 2017-03-07 PROCEDURE — 82306 VITAMIN D 25 HYDROXY: CPT

## 2017-03-07 PROCEDURE — 83036 HEMOGLOBIN GLYCOSYLATED A1C: CPT

## 2017-03-07 PROCEDURE — 80061 LIPID PANEL: CPT

## 2017-03-07 PROCEDURE — 85025 COMPLETE CBC W/AUTO DIFF WBC: CPT

## 2017-03-07 PROCEDURE — 36415 COLL VENOUS BLD VENIPUNCTURE: CPT

## 2017-03-07 PROCEDURE — 80053 COMPREHEN METABOLIC PANEL: CPT

## 2017-03-09 ENCOUNTER — TELEPHONE (OUTPATIENT)
Dept: MEDICAL GROUP | Facility: PHYSICIAN GROUP | Age: 72
End: 2017-03-09

## 2017-03-09 NOTE — Clinical Note
March 13, 2017        [facility]  [address] [facility] [address]        Dear Cait    Here are the results of your test(s):        ----- Message from Birgit Gale M.D. sent at 3/8/2017 6:08 PM PST -----   Cholesterol panel came back LDL or bad cholesterol increased from 156 -174. Continue red yeast rice. Avoid fatty foods and eat more fruits and vegetables and fish. Exercise regularly.   No diabetes. Blood sugar is normal. She however is still at risk for diabetes so she needs to continue to avoid sweets and decrease the carbohydrates.   Liver and kidney functions are normal.   Sodium continues to be slightly low. May add salt to food liberally but not excessively. Over consumption of work and decrease the sodium. Limit water consumption to about 3-4 glasses per day.   Vitamin D improved and increased from 26-32. Continue vitamin D supplementation.   No anemia.   Keep appointment as scheduled in April.             Sincerely,        Nelli Hernandez  Signed Electronically

## 2017-03-09 NOTE — TELEPHONE ENCOUNTER
----- Message from Birgit Gale M.D. sent at 3/8/2017  6:08 PM PST -----  Cholesterol panel came back LDL or bad cholesterol increased from 156 -174. Continue red yeast rice. Avoid fatty foods and eat more fruits and vegetables and fish. Exercise regularly.  No diabetes. Blood sugar is normal. She however is still at risk for diabetes so she needs to continue to avoid sweets and decrease the carbohydrates.  Liver and kidney functions are normal.   Sodium continues to be slightly low. May add salt to food liberally but not excessively. Over consumption of work and decrease the sodium. Limit water consumption to about 3-4 glasses per day.  Vitamin D improved and increased from 26-32. Continue vitamin D supplementation.  No anemia.  Keep appointment as scheduled in April.

## 2017-03-09 NOTE — TELEPHONE ENCOUNTER
1. Caller Name: Cait Pierre                                         Call Back Number: [phone] (home)     2. Message: Unable to reach patient left voicemail to call back.     3. Patient approves office to leave a detailed voicemail/MyChart message: N\A

## 2017-03-10 ENCOUNTER — TELEPHONE (OUTPATIENT)
Dept: MEDICAL GROUP | Facility: PHYSICIAN GROUP | Age: 72
End: 2017-03-10

## 2017-03-10 DIAGNOSIS — E89.0 POSTSURGICAL HYPOTHYROIDISM: ICD-10-CM

## 2017-03-10 NOTE — TELEPHONE ENCOUNTER
1. Caller Name: Cait Pierre                                         Call Back Number: [phone] (home)     2. Message: Unable to reach pt left voicemail to call back.     3. Patient approves office to leave a detailed voicemail/MyChart message: N\A

## 2017-03-13 NOTE — TELEPHONE ENCOUNTER
1. Caller Name: Cait Pierre                      Call Back Number: [phone] (home)     2. Message: Unable to reach pt left vm will send letter to notify pt.     3. Patient approves office to leave a detailed voicemail/MyChart message: N\A

## 2017-04-04 ENCOUNTER — OFFICE VISIT (OUTPATIENT)
Dept: MEDICAL GROUP | Facility: PHYSICIAN GROUP | Age: 72
End: 2017-04-04
Payer: MEDICARE

## 2017-04-04 ENCOUNTER — HOSPITAL ENCOUNTER (OUTPATIENT)
Dept: LAB | Facility: MEDICAL CENTER | Age: 72
End: 2017-04-04
Attending: FAMILY MEDICINE
Payer: MEDICARE

## 2017-04-04 VITALS
HEART RATE: 62 BPM | SYSTOLIC BLOOD PRESSURE: 110 MMHG | RESPIRATION RATE: 18 BRPM | WEIGHT: 139 LBS | OXYGEN SATURATION: 97 % | BODY MASS INDEX: 28.02 KG/M2 | HEIGHT: 59 IN | TEMPERATURE: 98.2 F | DIASTOLIC BLOOD PRESSURE: 62 MMHG

## 2017-04-04 DIAGNOSIS — E89.0 POSTSURGICAL HYPOTHYROIDISM: ICD-10-CM

## 2017-04-04 DIAGNOSIS — E04.2 MULTIPLE THYROID NODULES: ICD-10-CM

## 2017-04-04 DIAGNOSIS — R73.01 IMPAIRED FASTING BLOOD SUGAR: ICD-10-CM

## 2017-04-04 DIAGNOSIS — E78.5 DYSLIPIDEMIA: ICD-10-CM

## 2017-04-04 DIAGNOSIS — R05.3 CHRONIC COUGH: ICD-10-CM

## 2017-04-04 DIAGNOSIS — E03.9 HYPOTHYROIDISM, UNSPECIFIED TYPE: ICD-10-CM

## 2017-04-04 LAB
T3FREE SERPL-MCNC: 2.89 PG/ML (ref 2.4–4.2)
T4 FREE SERPL-MCNC: 0.83 NG/DL (ref 0.53–1.43)
TSH SERPL DL<=0.005 MIU/L-ACNC: 0.21 UIU/ML (ref 0.3–3.7)

## 2017-04-04 PROCEDURE — 1036F TOBACCO NON-USER: CPT | Performed by: FAMILY MEDICINE

## 2017-04-04 PROCEDURE — 4040F PNEUMOC VAC/ADMIN/RCVD: CPT | Performed by: FAMILY MEDICINE

## 2017-04-04 PROCEDURE — G8419 CALC BMI OUT NRM PARAM NOF/U: HCPCS | Performed by: FAMILY MEDICINE

## 2017-04-04 PROCEDURE — 3014F SCREEN MAMMO DOC REV: CPT | Performed by: FAMILY MEDICINE

## 2017-04-04 PROCEDURE — 36415 COLL VENOUS BLD VENIPUNCTURE: CPT

## 2017-04-04 PROCEDURE — 84439 ASSAY OF FREE THYROXINE: CPT

## 2017-04-04 PROCEDURE — 99214 OFFICE O/P EST MOD 30 MIN: CPT | Performed by: FAMILY MEDICINE

## 2017-04-04 PROCEDURE — 84443 ASSAY THYROID STIM HORMONE: CPT

## 2017-04-04 PROCEDURE — 84481 FREE ASSAY (FT-3): CPT

## 2017-04-04 PROCEDURE — 1101F PT FALLS ASSESS-DOCD LE1/YR: CPT | Performed by: FAMILY MEDICINE

## 2017-04-04 PROCEDURE — G8432 DEP SCR NOT DOC, RNG: HCPCS | Performed by: FAMILY MEDICINE

## 2017-04-04 ASSESSMENT — ENCOUNTER SYMPTOMS
COUGH: 1
CONSTIPATION: 1
HEADACHES: 1

## 2017-04-04 ASSESSMENT — PATIENT HEALTH QUESTIONNAIRE - PHQ9: CLINICAL INTERPRETATION OF PHQ2 SCORE: 0

## 2017-04-04 NOTE — MR AVS SNAPSHOT
"        Cait SalamancaArvin   2017 3:20 PM   Office Visit   MRN: [id number]    Department:  [facility]   Dept Phone:  [phone]    Description:  Female : 1945   Provider:  Birgit Gale M.D.           Reason for Visit     Cough Zamboni     Head Ache     Constipation           Allergies as of 2017     Allergen Noted Reactions    Levaquin 2012       Headache, Dizzy      You were diagnosed with     Chronic cough   [188498]       Hypothyroidism, unspecified type   [0899010]       Multiple thyroid nodules   [231442]       Dyslipidemia   [789575]       Impaired fasting blood sugar   [999375]         Vital Signs     Blood Pressure Pulse Temperature Respirations Height Weight    110/62 mmHg 62 36.8 °C (98.2 °F) 18 1.499 m (4' 11.02\") 63.05 kg (139 lb)    Body Mass Index Oxygen Saturation Last Menstrual Period Smoking Status          28.06 kg/m2 97% 2001 Former Smoker        Basic Information     Date Of Birth Sex Race Ethnicity Preferred Language    1945 Female White Non- English      Your appointments     2017 10:20 AM   Established Patient with Birgit Gale M.D.   [facility] (--)    [address]  [address]  [facility] [address] [phone]           You will be receiving a confirmation call a few days before your appointment from our automated call confirmation system.              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   2016 - Present    Pulmonary emphysema (CMS-HCC) J43.9   2016 - Present    Cough R05   2016 - Present    Reactive airway disease J45.909   2016 - Present      Health Maintenance   "       Date Due Completion Dates    IMM ZOSTER VACCINE 3/15/2007 ---    MAMMOGRAM 2/10/2018 2/10/2017, 10/22/2015, 10/14/2014, 10/8/2013, 10/8/2013 (Done), 5/29/2012, 5/29/2012 (N/S)    Override on 10/8/2013: Done    Override on 5/29/2012: (N/S)    BONE DENSITY 11/3/2020 11/3/2015, 12/31/2013, 5/29/2012, 5/29/2012 (N/S)    Override on 5/29/2012: (N/S)    IMM DTaP/Tdap/Td Vaccine (2 - Td) 6/8/2022 6/8/2012    COLONOSCOPY 9/17/2022 9/17/2012, 9/17/2012 (N/S)    Override on 9/17/2012: (N/S)            Current Immunizations     13-VALENT PCV PREVNAR 10/12/2015    Pneumococcal polysaccharide vaccine (PPSV-23) 6/8/2012    Tdap Vaccine 6/8/2012      Below and/or attached are the medications your provider expects you to take. Review all of your home medications and newly ordered medications with your provider and/or pharmacist. Follow medication instructions as directed by your provider and/or pharmacist. Please keep your medication list with you and share with your provider. Update the information when medications are discontinued, doses are changed, or new medications (including over-the-counter products) are added; and carry medication information at all times in the event of emergency situations     Allergies:  LEVAQUIN - (reactions not documented)               Medications  Valid as of: April 04, 2017 -  4:04 PM    Generic Name Brand Name Tablet Size Instructions for use    Albuterol Sulfate (Aero Soln) albuterol 108 (90 BASE) MCG/ACT Inhale 2 Puffs by mouth every 6 hours as needed for Shortness of Breath.        Amoxicillin (Recon Susp) AMOXIL 250 MG/5ML Take 10 mL by mouth 2 times a day.        Beclomethasone Dipropionate (Aero Soln) QVAR 80 MCG/ACT Inhale 2 Puffs by mouth 2 times a day. Use spacer. Rinse mouth after each use.        Hydrocodone-Acetaminophen (Solution) HYCET 7.5-325 MG/15ML Take 14.7 mL by mouth every four hours as needed.        Hydrocortisone (Cream) PROCTOZONE HC 2.5 % Apply to perirectal area  "twice daily.        NON SPECIFIED   \"Antibiotics\" for strep        NON SPECIFIED   every day. Blue green algae        Probiotic Product   Take  by mouth every day.        Thyroid (Tab) ARMOUR THYROID 60 MG TAKE ONE TABLET BY MOUTH ONCE DAILY        Thyroid (Tab) ARMOUR THYROID 60 MG TAKE ONE TABLET BY MOUTH ONCE DAILY        .                 Medicines prescribed today were sent to:     [facility] [address] [place] [address] [address] [facility]    [address] [facility] [place] [address]    Phone: [phone] Fax: [phone]    Open 24 Hours?: No      Medication refill instructions:       If your prescription bottle indicates you have medication refills left, it is not necessary to call your provider’s office. Please contact your pharmacy and they will refill your medication.    If your prescription bottle indicates you do not have any refills left, you may request refills at any time through one of the following ways: The online [employer] system (except Urgent Care), by calling your provider’s office, or by asking your pharmacy to contact your provider’s office with a refill request. Medication refills are processed only during regular business hours and may not be available until the next business day. Your provider may request additional information or to have a follow-up visit with you prior to refilling your medication.   *Please Note: Medication refills are assigned a new Rx number when refilled electronically. Your pharmacy may indicate that no refills were authorized even though a new prescription for the same medication is available at the pharmacy. Please request the medicine by name with the pharmacy before contacting your provider for a refill.        Your To Do List     Future Labs/Procedures Complete By Expires    US-SOFT TISSUES OF HEAD - NECK  As directed 4/4/2018      Other Notes About Your Plan     Cait is a [facility] patient of Dr. Gonzalez.    This appointment is 3-7-16. It is the beginning of " the year and will require all chronic conditions to be assessed and documented in conjunction with any other identified concerns during the visit:    No Query           [employer] Access Code: [email or web address]  Expires: 5/4/2017  3:07 PM    [employer]  A secure, online tool to manage your health information     [employer]® is a secure, online tool that connects you to your personalized health information from the privacy of your home -- day or night - making it very easy for you to manage your healthcare. Once the activation process is completed, you can even access your medical information using the [employer] debra, which is available for free in the Apple Debra store or Google Play store.     [employer] provides the following levels of access (as shown below):   My Chart Features   [facility] Doctor [facility]  Urgent  Care Non-[facility]  Primary Care  Doctor   Email your healthcare team securely and privately 24/7 X X X    Manage appointments: schedule your next appointment; view details of past/upcoming appointments X      Request prescription refills. X      View recent personal medical records, including lab and immunizations X X X X   View health record, including health history, allergies, medications X X X X   Read reports about your outpatient visits, procedures, consult and ER notes X X X X   See your discharge summary, which is a recap of your hospital and/or ER visit that includes your diagnosis, lab results, and care plan. X X       How to register for [employer]:  1. Go to  [email or web address].  2. Click on the Sign Up Now box, which takes you to the New Member Sign Up page. You will need to provide the following information:  a. Enter your [employer] Access Code exactly as it appears at the top of this page. (You will not need to use this code after you’ve completed the sign-up process. If you do not sign up before the expiration date, you must request a new code.)   b. Enter  your date of birth.   c. Enter your home email address.   d. Click Submit, and follow the next screen’s instructions.  3. Create a [employer] ID. This will be your [employer] login ID and cannot be changed, so think of one that is secure and easy to remember.  4. Create a [facility] password. You can change your password at any time.  5. Enter your Password Reset Question and Answer. This can be used at a later time if you forget your password.   6. Enter your e-mail address. This allows you to receive e-mail notifications when new information is available in [employer].  7. Click Sign Up. You can now view your health information.    For assistance activating your [employer] account, call [phone]

## 2017-04-05 ENCOUNTER — TELEPHONE (OUTPATIENT)
Dept: MEDICAL GROUP | Facility: PHYSICIAN GROUP | Age: 72
End: 2017-04-05

## 2017-04-05 DIAGNOSIS — E03.9 HYPOTHYROIDISM, UNSPECIFIED TYPE: ICD-10-CM

## 2017-04-05 NOTE — TELEPHONE ENCOUNTER
1. Caller Name: Cait SalamancaBrennaSteven                      Call Back Number: [phone] (home)     2. Message: Pt notified of results below.     3. Patient approves office to leave a detailed voicemail/MyChart message: N\A

## 2017-04-05 NOTE — TELEPHONE ENCOUNTER
----- Message from Birgit Gale M.D. sent at 4/5/2017  6:50 AM PDT -----  Patient's blood work came back your thyroid is slightly over replaced or overactive. We need to decrease the dose of  armor thyroid 60 mg from one tablet daily to one tablet alternating with half a tablet. We will do follow-up thyroid test in 8 weeks. This is a nonfasting blood work. We will contact her with results.

## 2017-04-05 NOTE — PROGRESS NOTES
Subjective:      Cait Pierre is a 72 y.o. female who presents with Cough; Head Ache; and Constipation            Cough  Associated symptoms include headaches.   Head Ache   Associated symptoms include coughing.   Constipation      Patient returns for follow-up for medical problems.    In terms of her chronic cough, this is much better. She still gets the cough when she is eating certain foods such as vinegar. Previous workup including chest x-ray did not show any acute problems and pulmonary function test done by the pulmonologist came back mild reactive airway disease. She tried steroid inhaler/long-acting bronchodilator and anticholinergics such as Spiriva and she said they did not really make a difference with the cough. She also has albuterol HFA that she uses an as needed basis. She had brown base lesion and right vocal cord lesion removed by ENT specialist which came back benign.    She continues to take thyroid replacement for hypothyroidism which is armor thyroid. She also has multiple thyroid nodules status post FNA of largest nodule which came back benign. Dr. Neal, endocrinologist recommended yearly thyroid ultrasound follow-up in the nodules which will be due in July of this year. She feels fatigued, feeling cold and she has been having constipation. The last thyroid function tests were done in July 2016. I have ordered updated blood work which she has not done yet.    She continues to take red yeast rice only for dyslipidemia. Lately she said she has not been taking this regularly. She has refused statins because of fear of potential side effects and problems with the medication.    She has a hard bump on the top of her head that has been there for 7 years. This is nonpainful.    We follow her for impaired fasting blood sugar and she manages this by watching her diet.    Past medical history, past surgical history, family history reviewed-no changes    Social history reviewed-no  "changes    Allergies reviewed-no changes    Medications reviewed-no changes    Review of Systems   Respiratory: Positive for cough.    Gastrointestinal: Positive for constipation.   Neurological: Positive for headaches.    additionally as per history of present illness, the rest are negative.       Objective:     /62 mmHg  Pulse 62  Temp(Src) 36.8 °C (98.2 °F)  Resp 18  Ht 1.499 m (4' 11.02\")  Wt 63.05 kg (139 lb)  BMI 28.06 kg/m2  SpO2 97%  LMP 01/01/2001     Physical Exam     Examined alert, awake, oriented, not in distress    Head-half a centimeter hard bony mass at the top of the occipital area probably bony cyst versus suture  Neck-supple, no lymphadenopathy, no thyromegaly  Lungs-clear to auscultation, no rales, no wheezes  Heart-regular rate and rhythm, no murmur  Extremities-no edema, clubbing, cyanosis     Hospital Outpatient Visit on 03/07/2017   Component Date Value   • Cholesterol,Tot 03/07/2017 250*   • Triglycerides 03/07/2017 139    • HDL 03/07/2017 48    • LDL 03/07/2017 174*   • Sodium 03/07/2017 131*   • Potassium 03/07/2017 4.4    • Chloride 03/07/2017 99    • Co2 03/07/2017 25    • Anion Gap 03/07/2017 7.0    • Glucose 03/07/2017 81    • Bun 03/07/2017 15    • Creatinine 03/07/2017 0.88    • Calcium 03/07/2017 10.1    • AST(SGOT) 03/07/2017 21    • ALT(SGPT) 03/07/2017 17    • Alkaline Phosphatase 03/07/2017 108*   • Total Bilirubin 03/07/2017 0.4    • Albumin 03/07/2017 4.3    • Total Protein 03/07/2017 7.7    • Globulin 03/07/2017 3.4    • A-G Ratio 03/07/2017 1.3    • Glycohemoglobin 03/07/2017 5.9*   • Est Avg Glucose 03/07/2017 123    • 25-Hydroxy   Vitamin D 25 03/07/2017 32    • WBC 03/07/2017 8.8    • RBC 03/07/2017 4.38    • Hemoglobin 03/07/2017 13.6    • Hematocrit 03/07/2017 41.7    • MCV 03/07/2017 95.2    • MCH 03/07/2017 31.1    • MCHC 03/07/2017 32.6*   • RDW 03/07/2017 46.2    • Platelet Count 03/07/2017 359    • MPV 03/07/2017 9.8    • Neutrophils-Polys 03/07/2017 " 71.10    • Lymphocytes 03/07/2017 20.10*   • Monocytes 03/07/2017 6.70    • Eosinophils 03/07/2017 1.00    • Basophils 03/07/2017 0.80    • Immature Granulocytes 03/07/2017 0.30    • Nucleated RBC 03/07/2017 0.00    • Neutrophils (Absolute) 03/07/2017 6.23    • Lymphs (Absolute) 03/07/2017 1.76    • Monos (Absolute) 03/07/2017 0.59    • Eos (Absolute) 03/07/2017 0.09    • Baso (Absolute) 03/07/2017 0.07    • Immature Granulocytes (a* 03/07/2017 0.03    • NRBC (Absolute) 03/07/2017 0.00    • GFR If  03/07/2017 >60    • GFR If Non  Ameri* 03/07/2017 >60         Assessment/Plan:     1. Chronic cough  It is getting better. She only has occasional cough when she consuming some foods that are irritating such as vinegar. Advised to use the albuterol HFA as needed basis.    2. Hypothyroidism, unspecified type  We will do follow-up thyroid function tests especially because of above-mentioned symptoms of cold intolerance, fatigue and constipation. We will adjust the dose of [place] Thyroid depending on results.    3. Multiple thyroid nodules  We will do follow-up ultrasound of the thyroid nodules just coming July.    4. Dyslipidemia  Cholesterol is still high. She does not want to take statins. Advised to continue red yeast rice and to work on watching the diet in terms of fatty foods, eating more fruits and vegetables and fish and regular exercises.    5. Impaired fasting blood sugar  Fasting blood sugar is normal. She will continue to watch her diet in terms of carbohydrates and sweets.        Please note that this dictation was created using voice recognition software. I have worked with consultants from the vendor as well as technical experts from [employer] to optimize the interface. I have made every reasonable attempt to correct obvious errors, but I expect that there are errors of grammar and possibly content I did not discover before finalizing the note.

## 2017-04-24 ENCOUNTER — HOSPITAL ENCOUNTER (OUTPATIENT)
Dept: RADIOLOGY | Facility: MEDICAL CENTER | Age: 72
End: 2017-04-24
Attending: FAMILY MEDICINE
Payer: MEDICARE

## 2017-04-24 ENCOUNTER — TELEPHONE (OUTPATIENT)
Dept: MEDICAL GROUP | Facility: PHYSICIAN GROUP | Age: 72
End: 2017-04-24

## 2017-04-24 DIAGNOSIS — E04.2 MULTIPLE THYROID NODULES: ICD-10-CM

## 2017-04-24 PROCEDURE — 76536 US EXAM OF HEAD AND NECK: CPT

## 2017-04-24 NOTE — TELEPHONE ENCOUNTER
----- Message from Jessica Ellison M.D. sent at 4/24/2017  2:15 PM PDT -----  Please let patient know that her head and neck ultrasound showed two thyroid nodules on the right side that have not changed in size.   Dr. Ellison covering for Dr. Gale

## 2017-07-31 ENCOUNTER — TELEPHONE (OUTPATIENT)
Dept: MEDICAL GROUP | Facility: PHYSICIAN GROUP | Age: 72
End: 2017-07-31

## 2017-07-31 NOTE — TELEPHONE ENCOUNTER
ESTABLISHED PATIENT PRE-VISIT PLANNING     Note: Patient will not be contacted if there is no indication to call.     1.  Reviewed notes from the last few office visits within the medical group: Yes    2.  If any orders were placed at last visit or intended to be done for this visit (i.e. 6 mos follow-up), do we have Results/Consult Notes?        •  Labs - Labs were not ordered at last office visit.       •  Imaging - Imaging was not ordered at last office visit.       •  Referrals - No referrals were ordered at last office visit.    3. Is this appointment scheduled as a Hospital Follow-Up? No    4.  Immunizations were updated in Epic using WebIZ?: Epic matches WebIZ       •  Web Iz Recommendations: FLU, TD and ZOSTAVAX (Shingles)    5.  Patient is due for the following Health Maintenance Topics:   Health Maintenance Due   Topic Date Due   • IMM ZOSTER VACCINE  03/15/2007   • Annual Wellness Visit  03/08/2017       - Patient has completed PNEUMOVAX (PPSV23), PREVNAR (PCV13)  and TDAP Immunization(s) per WebIZ. Chart has been updated.    6.  Patient was NOT informed to arrive 15 min prior to their scheduled appointment and bring in their medication bottles.

## 2017-08-01 ENCOUNTER — HOSPITAL ENCOUNTER (OUTPATIENT)
Dept: LAB | Facility: MEDICAL CENTER | Age: 72
End: 2017-08-01
Attending: FAMILY MEDICINE
Payer: MEDICARE

## 2017-08-01 ENCOUNTER — OFFICE VISIT (OUTPATIENT)
Dept: MEDICAL GROUP | Facility: PHYSICIAN GROUP | Age: 72
End: 2017-08-01
Payer: MEDICARE

## 2017-08-01 VITALS
OXYGEN SATURATION: 96 % | DIASTOLIC BLOOD PRESSURE: 64 MMHG | TEMPERATURE: 98.6 F | SYSTOLIC BLOOD PRESSURE: 120 MMHG | HEIGHT: 59 IN | WEIGHT: 140 LBS | RESPIRATION RATE: 16 BRPM | HEART RATE: 76 BPM | BODY MASS INDEX: 28.22 KG/M2

## 2017-08-01 DIAGNOSIS — E78.5 DYSLIPIDEMIA: ICD-10-CM

## 2017-08-01 DIAGNOSIS — J45.30 MILD PERSISTENT REACTIVE AIRWAY DISEASE WITHOUT COMPLICATION: ICD-10-CM

## 2017-08-01 DIAGNOSIS — E03.9 HYPOTHYROIDISM, UNSPECIFIED TYPE: ICD-10-CM

## 2017-08-01 DIAGNOSIS — R13.10 DYSPHAGIA, UNSPECIFIED TYPE: ICD-10-CM

## 2017-08-01 DIAGNOSIS — E04.2 MULTIPLE THYROID NODULES: ICD-10-CM

## 2017-08-01 LAB — TSH SERPL DL<=0.005 MIU/L-ACNC: 0.47 UIU/ML (ref 0.3–3.7)

## 2017-08-01 PROCEDURE — 99214 OFFICE O/P EST MOD 30 MIN: CPT | Performed by: FAMILY MEDICINE

## 2017-08-01 PROCEDURE — 36415 COLL VENOUS BLD VENIPUNCTURE: CPT

## 2017-08-01 PROCEDURE — 84443 ASSAY THYROID STIM HORMONE: CPT

## 2017-08-01 RX ORDER — INHALER, ASSIST DEVICES
1 SPACER (EA) MISCELLANEOUS ONCE
Qty: 1 EACH | Refills: 0 | Status: SHIPPED | OUTPATIENT
Start: 2017-08-01 | End: 2017-08-01

## 2017-08-01 ASSESSMENT — PAIN SCALES - GENERAL: PAINLEVEL: NO PAIN

## 2017-08-01 ASSESSMENT — ENCOUNTER SYMPTOMS: COUGH: 1

## 2017-08-01 NOTE — PROGRESS NOTES
Subjective:      Cait Pierre is a 72 y.o. female who presents with Follow-Up and Cough            Cough      Patient is here complaining of ongoing problems with episodes of coughing making her choke and not able to breathe. The most recent one was around the end of June. She said she was sitting in the dining table and was drinking water when she suddenly felt choking sensation and she couldn't breathe. From time to time she also has the same episode of shortness of breath and not able to breathe when she is at [employer]. Workup done by the pulmonologist showed mild reactive airway disease. She was tried on Spiriva which caused increase in coughing. The last visit with the pulmonary medicine clinic was in January 2017 and she was started on Qvar 80 µg 2 puffs twice daily with spacer. She said when she used this inhaler it caused her tongue to stick at the roof of the mouth. She has not been back to the pulmonologist and canceled her appointment in 3/17.    She she was seen and evaluated by ENT specialist and was found to have lesion at the base of the tongue and vocal cord which were removed in 9/16 it came back benign. She said she has on and off raspiness of the voice. She has follow-up appointment with the ENT specialist next month.    In terms of her hypothyroidism, we decreased the dose of the armor thyroid in 4/17 from 60 mg one tablet daily 6 days a week and half a tablet daily one day week to 60 mg one tablet alternating with half a tablet. She said she took this dose for about 10 days only because she started having hot flashes and she changed the dose to 60 mg 2 tablets alternating with half a tablet and hot flashes went away. She has not done a follow-up TSH.    She has multiple thyroid nodules status post FNA in 2014 that came back benign. She already had left thyroidectomy. She is due for follow-up ultrasound with the last ultrasound in 7/16.    For her dyslipidemia, she continues to manage  "this with her own efforts and taking red yeast rice. She has  refused at this.    Review of Systems   Respiratory: Positive for cough.     additionally as per history of present illness, the rest are negative.       Objective:     /64 mmHg  Pulse 76  Temp(Src) 37 °C (98.6 °F)  Resp 16  Ht 1.499 m (4' 11.02\")  Wt 63.504 kg (140 lb)  BMI 28.26 kg/m2  SpO2 96%  LMP 01/01/2001  Breastfeeding? No     Physical Exam     Examined alert, awake, oriented, not in distress    Neck-supple, no lymphadenopathy, no thyromegaly  Lungs-clear to auscultation, no rales, no wheezes  Heart-regular rate and rhythm, no murmur  Extremities-no edema, clubbing, cyanosis            Assessment/Plan:     1. Mild persistent reactive airway disease without complication  Discussed with her at length the need to be on steroid inhaler for control of her symptoms. She had problems with Qvar 80 µg before and so I will put her on lower dose which is 40 µg one puff twice daily with a spacer. Advised to rinse the mouth every after using the inhaler. Advised to use albuterol inhaler as a rescue inhaler. She will schedule appointment with pulmonologist for follow-up.  - beclomethasone (QVAR) 40 MCG/ACT inhaler; Inhale 1 Puff by mouth 2 Times a Day.  Dispense: 1 Inhaler; Refill: 1  - Spacer/Aero-Holding Chambers (AEROCHAMBER MV) Misc; 1 Each by Does not apply route Once for 1 dose.  Dispense: 1 Each; Refill: 0    2. Dysphagia, unspecified type  She may be having problems with dysphagia causing the choking sensation. I will do an esophagram with barium swallow to further evaluate this.  - DX-ESOPHAGUS - BARIUM SWALLOW; Future    3. Hypothyroidism, unspecified type  She will follow-up TSH today. She will stay on current dose of her armor thyroid until we get the results and we will adjust depending on results.    4. Multiple thyroid nodules  She will schedule follow-up thyroid ultrasound.  - US-SOFT TISSUES OF HEAD - NECK; Future    5. " Dyslipidemia  She will continue to manage this with her own efforts and red yeast rice. We will do follow-up blood work in the next few months. She continues to refuse statins.      Please note that this dictation was created using voice recognition software. I have worked with consultants from the vendor as well as technical experts from [facility] to optimize the interface. I have made every reasonable attempt to correct obvious errors, but I expect that there are errors of grammar and possibly content I did not discover before finalizing the note.

## 2017-08-01 NOTE — MR AVS SNAPSHOT
"Cait Pierre   2017 8:40 AM   Office Visit   MRN: [id number]    Department:  [facility]   Dept Phone:  [phone]    Description:  Female : 1945   Provider:  Birgit Gale M.D.           Reason for Visit     Follow-Up 3 month follow up     Cough in  had a coughing spell and could not breath. Would like to have CT. She has also had problems with choaking       Allergies as of 2017     Allergen Noted Reactions    Levaquin 2012       Headache, Dizzy      You were diagnosed with     Mild persistent reactive airway disease without complication   [4077763]       Dysphagia, unspecified type   [6141690]       Hypothyroidism, unspecified type   [3393057]       Multiple thyroid nodules   [530602]       Dyslipidemia   [540201]         Vital Signs     Blood Pressure Pulse Temperature Respirations Height Weight    120/64 mmHg 76 37 °C (98.6 °F) 16 1.499 m (4' 11.02\") 63.504 kg (140 lb)    Body Mass Index Oxygen Saturation Last Menstrual Period Breastfeeding? Smoking Status       28.26 kg/m2 96% 2001 No Former Smoker       Basic Information     Date Of Birth Sex Race Ethnicity Preferred Language    1945 Female White Non- English      Your appointments     2017  9:00 AM   Established Patient with Birgit Gale M.D.   [facility] (--)    [address]  [address]  [facility] [id number] [phone]           You will be receiving a confirmation call a few days before your appointment from our automated call confirmation system.              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   2016 - Present    Pulmonary emphysema (CMS-HCC) J43.9   2016 - Present    Cough " R05   11/29/2016 - Present    Reactive airway disease J45.909   11/29/2016 - Present      Health Maintenance        Date Due Completion Dates    IMM INFLUENZA (1) 1/16/2018 (Originally 9/1/2017) 10/12/2015 (Declined)    Override on 10/12/2015: Patient Declined    IMM ZOSTER VACCINE 8/16/2018 (Originally 3/15/2007) ---    MAMMOGRAM 2/10/2018 2/10/2017, 10/22/2015, 10/14/2014, 10/8/2013, 10/8/2013 (Done), 5/29/2012, 5/29/2012 (N/S)    Override on 10/8/2013: Done    Override on 5/29/2012: (N/S)    BONE DENSITY 11/3/2020 11/3/2015, 12/31/2013, 5/29/2012, 5/29/2012 (N/S)    Override on 5/29/2012: (N/S)    IMM DTaP/Tdap/Td Vaccine (2 - Td) 6/8/2022 6/8/2012    COLONOSCOPY 9/17/2022 9/17/2012, 9/17/2012 (N/S)    Override on 9/17/2012: (N/S)            Current Immunizations     13-VALENT PCV PREVNAR 10/12/2015    Pneumococcal polysaccharide vaccine (PPSV-23) 6/8/2012    Tdap Vaccine 6/8/2012      Below and/or attached are the medications your provider expects you to take. Review all of your home medications and newly ordered medications with your provider and/or pharmacist. Follow medication instructions as directed by your provider and/or pharmacist. Please keep your medication list with you and share with your provider. Update the information when medications are discontinued, doses are changed, or new medications (including over-the-counter products) are added; and carry medication information at all times in the event of emergency situations     Allergies:  LEVAQUIN - (reactions not documented)               Medications  Valid as of: August 01, 2017 -  9:15 AM    Generic Name Brand Name Tablet Size Instructions for use    Albuterol Sulfate (Aero Soln) albuterol 108 (90 BASE) MCG/ACT Inhale 2 Puffs by mouth every 6 hours as needed for Shortness of Breath.        Amoxicillin (Recon Susp) AMOXIL 250 MG/5ML Take 10 mL by mouth 2 times a day.        Beclomethasone Dipropionate (Aero Soln) QVAR 80 MCG/ACT Inhale 2 Puffs by  "mouth 2 times a day. Use spacer. Rinse mouth after each use.        Beclomethasone Dipropionate (Aero Soln) QVAR 40 MCG/ACT Inhale 1 Puff by mouth 2 Times a Day.        Hydrocodone-Acetaminophen (Solution) HYCET 7.5-325 MG/15ML Take 14.7 mL by mouth every four hours as needed.        Hydrocortisone (Cream) PROCTOZONE HC 2.5 % Apply to perirectal area twice daily.        NON SPECIFIED   \"Antibiotics\" for strep        NON SPECIFIED   every day. Blue green algae        Probiotic Product   Take  by mouth every day.        Spacer/Aero-Holding Chambers (Misc) AEROCHAMBER MV  1 Each by Does not apply route Once for 1 dose.        Thyroid (Tab) ARMOUR THYROID 60 MG TAKE ONE TABLET BY MOUTH ONCE DAILY        Thyroid (Tab) ARMOUR THYROID 60 MG TAKE ONE TABLET BY MOUTH ONCE DAILY        .                 Medicines prescribed today were sent to:     [facility] [address] [place] [id number]    Phone: [phone] Fax: [phone]    Open 24 Hours?: No      Medication refill instructions:       If your prescription bottle indicates you have medication refills left, it is not necessary to call your provider’s office. Please contact your pharmacy and they will refill your medication.    If your prescription bottle indicates you do not have any refills left, you may request refills at any time through one of the following ways: The online [employer] system (except Urgent Care), by calling your provider’s office, or by asking your pharmacy to contact your provider’s office with a refill request. Medication refills are processed only during regular business hours and may not be available until the next business day. Your provider may request additional information or to have a follow-up visit with you prior to refilling your medication.   *Please Note: Medication refills are assigned a new Rx number when refilled electronically. Your pharmacy may indicate that no refills were " authorized even though a new prescription for the same medication is available at the pharmacy. Please request the medicine by name with the pharmacy before contacting your provider for a refill.        Your To Do List     Future Labs/Procedures Complete By Expires    DX-ESOPHAGUS - BARIUM SWALLOW  As directed 2/1/2018    US-SOFT TISSUES OF HEAD - NECK  As directed 8/1/2018      Other Notes About Your Plan     Cait is a [facility] patient of Dr. Gonzalez.    This appointment is 3-7-16. It is the beginning of the year and will require all chronic conditions to be assessed and documented in conjunction with any other identified concerns during the visit:    No Query           ZocDoc Access Code: [email or web address]  Expires: 8/31/2017  9:15 AM    ZocDoc  A secure, online tool to manage your health information     [employer]’s ZocDoc® is a secure, online tool that connects you to your personalized health information from the privacy of your home -- day or night - making it very easy for you to manage your healthcare. Once the activation process is completed, you can even access your medical information using the ZocDoc debra, which is available for free in the Apple Debra store or Google Play store.     ZocDoc provides the following levels of access (as shown below):   My Chart Features   [facility] Doctor [facility]  Specialists [facility]  Urgent  Care Non-[facility]  Primary Care  Doctor   Email your healthcare team securely and privately 24/7 X X X    Manage appointments: schedule your next appointment; view details of past/upcoming appointments X      Request prescription refills. X      View recent personal medical records, including lab and immunizations X X X X   View health record, including health history, allergies, medications X X X X   Read reports about your outpatient visits, procedures, consult and ER notes X X X X   See your discharge summary, which is a recap of your hospital and/or ER visit that  includes your diagnosis, lab results, and care plan. X X       How to register for USEREADY:  1. Go to  [email or web address].  2. Click on the Sign Up Now box, which takes you to the New Member Sign Up page. You will need to provide the following information:  a. Enter your USEREADY Access Code exactly as it appears at the top of this page. (You will not need to use this code after you’ve completed the sign-up process. If you do not sign up before the expiration date, you must request a new code.)   b. Enter your date of birth.   c. Enter your home email address.   d. Click Submit, and follow the next screen’s instructions.  3. Create a [employer] ID. This will be your [employer] login ID and cannot be changed, so think of one that is secure and easy to remember.  4. Create a [employer] password. You can change your password at any time.  5. Enter your Password Reset Question and Answer. This can be used at a later time if you forget your password.   6. Enter your e-mail address. This allows you to receive e-mail notifications when new information is available in USEREADY.  7. Click Sign Up. You can now view your health information.    For assistance activating your USEREADY account, call [phone]

## 2017-08-16 ENCOUNTER — TELEPHONE (OUTPATIENT)
Dept: MEDICAL GROUP | Facility: PHYSICIAN GROUP | Age: 72
End: 2017-08-16

## 2017-08-16 NOTE — TELEPHONE ENCOUNTER
[facility]      Entered by Birgit Gale M.D. at 8/1/2017  4:26 PM     Your thyroid test came back normal.   You can stay on the current dose of [place]inal Thyroid 60 mg 2 tablets alternating with half a tablet.   Let me know if you have any questions or concerns.     Birgit Gale MD       Result Notes      Notes Recorded by Birgit Gale M.D. on 8/1/2017 at 4:26 PM  Your thyroid test came back normal.  You can stay on the current dose of Encinal Thyroid 60 mg 2 tablets alternating with half a tablet.  Let me know if you have any questions or concerns

## 2017-08-29 ENCOUNTER — TELEPHONE (OUTPATIENT)
Dept: MEDICAL GROUP | Facility: PHYSICIAN GROUP | Age: 72
End: 2017-08-29

## 2017-08-29 ENCOUNTER — HOSPITAL ENCOUNTER (OUTPATIENT)
Dept: RADIOLOGY | Facility: MEDICAL CENTER | Age: 72
End: 2017-08-29
Attending: FAMILY MEDICINE
Payer: MEDICARE

## 2017-08-29 DIAGNOSIS — R13.10 DYSPHAGIA, UNSPECIFIED TYPE: ICD-10-CM

## 2017-08-29 DIAGNOSIS — E04.2 MULTIPLE THYROID NODULES: ICD-10-CM

## 2017-08-29 DIAGNOSIS — K21.9 GASTROESOPHAGEAL REFLUX DISEASE WITHOUT ESOPHAGITIS: ICD-10-CM

## 2017-08-29 PROCEDURE — 700112 HCHG RX REV CODE 229: Performed by: FAMILY MEDICINE

## 2017-08-29 PROCEDURE — A9270 NON-COVERED ITEM OR SERVICE: HCPCS | Performed by: FAMILY MEDICINE

## 2017-08-29 PROCEDURE — 74220 X-RAY XM ESOPHAGUS 1CNTRST: CPT

## 2017-08-29 PROCEDURE — 76536 US EXAM OF HEAD AND NECK: CPT

## 2017-08-29 PROCEDURE — 700101 HCHG RX REV CODE 250: Performed by: FAMILY MEDICINE

## 2017-08-29 RX ORDER — RANITIDINE 150 MG/1
150 TABLET ORAL 2 TIMES DAILY
Qty: 60 TAB | Refills: 2 | Status: SHIPPED | OUTPATIENT
Start: 2017-08-29 | End: 2019-10-01

## 2017-08-29 RX ADMIN — BARIUM SULFATE 700 MG: 700 TABLET ORAL at 09:30

## 2017-08-29 RX ADMIN — ANTACID/ANTIFLATULENT 1 PACKET: 380; 550; 10; 10 GRANULE, EFFERVESCENT ORAL at 09:30

## 2017-08-29 NOTE — TELEPHONE ENCOUNTER
----- Message from Birgit Gale M.D. sent at 8/29/2017 12:04 PM PDT -----  Your esophagogram came back small sliding hiatal hernia (stomach slides up and down from the abdomen to the chest) and minimal reflux in the lower portion of the esophagus. No evidence of narrowing of the esophagus. The barium tablet went down to the stomach without any delay.  I will put you on medication to help manage the reflux and the hiatal hernia and hopefully this would improve the choking sensation.  We will try ranitidine 150 mg one tablet twice a day. Prescription sent to pharmacy. Avoid caffeine or any caffeinated drinks because they will cause more problems with acid reflux.  Eat early supper (4 hours before going to bed) so the food is already gone from the stomach when you lie down in bed at night. Elevate the head with pillows at night to avoid the reflux.

## 2017-11-06 ENCOUNTER — TELEPHONE (OUTPATIENT)
Dept: MEDICAL GROUP | Facility: PHYSICIAN GROUP | Age: 72
End: 2017-11-06

## 2017-11-06 NOTE — TELEPHONE ENCOUNTER
ESTABLISHED PATIENT PRE-VISIT PLANNING     Note: Patient will not be contacted if there is no indication to call.     1.  Reviewed notes from the last few office visits within the medical group: Yes    2.  If any orders were placed at last visit or intended to be done for this visit (i.e. 6 mos follow-up), do we have Results/Consult Notes?        •  Labs - Labs ordered, completed on 08/01/17 and results are in chart.   Note: If patient appointment is for lab review and patient did not complete labs, check with provider if OK to reschedule patient until labs completed.       •  Imaging - Imaging ordered, completed and results are in chart.       •  Referrals - No referrals were ordered at last office visit.    3. Is this appointment scheduled as a Hospital Follow-Up? No    4.  Immunizations were updated in Epic using WebIZ?: Epic matches WebIZ       •  Web Iz Recommendations: FLU, TD and ZOSTAVAX (Shingles)    5.  Patient is due for the following Health Maintenance Topics:   Health Maintenance Due   Topic Date Due   • Annual Wellness Visit  03/08/2017       - Patient has completed PNEUMOVAX (PPSV23), PREVNAR (PCV13)  and TDAP Immunization(s) per WebIZ. Chart has been updated.      6.  Patient was NOT informed to arrive 15 min prior to their scheduled appointment and bring in their medication bottles.

## 2017-11-07 ENCOUNTER — OFFICE VISIT (OUTPATIENT)
Dept: MEDICAL GROUP | Facility: PHYSICIAN GROUP | Age: 72
End: 2017-11-07
Payer: MEDICARE

## 2017-11-07 VITALS
SYSTOLIC BLOOD PRESSURE: 110 MMHG | HEIGHT: 59 IN | BODY MASS INDEX: 28.84 KG/M2 | OXYGEN SATURATION: 97 % | WEIGHT: 143.08 LBS | TEMPERATURE: 97.7 F | DIASTOLIC BLOOD PRESSURE: 60 MMHG | HEART RATE: 74 BPM

## 2017-11-07 DIAGNOSIS — E04.2 MULTIPLE THYROID NODULES: ICD-10-CM

## 2017-11-07 DIAGNOSIS — R09.89 CHOKING EPISODE: ICD-10-CM

## 2017-11-07 DIAGNOSIS — M85.862 OSTEOPENIA OF LEFT LOWER LEG: ICD-10-CM

## 2017-11-07 DIAGNOSIS — E78.5 DYSLIPIDEMIA: ICD-10-CM

## 2017-11-07 DIAGNOSIS — E03.9 HYPOTHYROIDISM, UNSPECIFIED TYPE: ICD-10-CM

## 2017-11-07 PROCEDURE — 99214 OFFICE O/P EST MOD 30 MIN: CPT | Performed by: FAMILY MEDICINE

## 2017-11-08 NOTE — PROGRESS NOTES
"Subjective:      Cait Pierre is a 72 y.o. female who presents with Follow-Up (xray results)            HPI     Patient is here for follow-up of her medical problems.    I sent her for esophagogram with barium swallow because of 2 choking episodes. This came back small sliding hiatal hernia and reflux otherwise no abnormalities in the esophagus. She says she has been very careful with what she eats and when she swallows. She has not had any recurrence of episodes since then. I added Qvar for her mild persistent reactive airway disease hoping that this would also help with the choking episode associated with coughing. She said she did not get the prescription. She said so far her breathing has been doing well without the need for inhalers.    She continues to take thyroid replacement for hypothyroidism. Blood work was done before this visit.    We follow her for multiple thyroid nodules. She already had an FNA in 2014 which came back benign. She is status post left thyroidectomy in 2012 for benign goiter.    For her dyslipidemia, she is only taking red yeast rice. She said lately she has not been watching her diet. Patient has refused statins.     For her osteopenia of the left hip, the last bone density scan was in November 2015. She takes calcium and vitamin D supplementation.    Past medical history, past surgical history, family history reviewed-no changes    Social history reviewed-no changes    Allergies reviewed-no changes    Medications reviewed-no changes        ROS     Review of systems as per history of present illness, the rest are negative.       Objective:     /60   Pulse 74   Temp 36.5 °C (97.7 °F)   Ht 1.499 m (4' 11\")   Wt 64.9 kg (143 lb 1.3 oz)   LMP 01/01/2001   SpO2 97%   BMI 28.90 kg/m²      Physical Exam       Examined alert, awake, oriented, not in distress    Neck-supple, no lymphadenopathy, no thyromegaly  Lungs-clear to auscultation, no rales, no " wheezes  Heart-regular rate and rhythm, no murmur  Extremities-no edema, clubbing, cyanosis          Assessment/Plan:     1. Choking episode  Results of the esophagogram with barium swallow as per above. She denies having any heartburn. She has not had any more choking episodes since she has been careful with what she eats and with swallowing. She doesn't feel she needs medication for GERD. We will continue to follow.     2. Hypothyroidism, unspecified type  Last TSH in August 2017 came back adequately replaced. Continue current thyroid replacement. We will do follow-up TSH next visit.  - LIPID PROFILE; Future  - COMP METABOLIC PANEL; Future  - CBC WITH DIFFERENTIAL; Future  - TSH; Future  - VITAMIN D,25 HYDROXY; Future    3. Multiple thyroid nodules  Status post left thyroidectomy in 2012 for benign goiter. She has multiple thyroid nodules in the right thyroid lobe and FNA done in 2014 came back benign. We did a follow-up ultrasound in August 2017 which came back stable thyroid nodules subcentimeter in size. We will do yearly thyroid ultrasound.  - LIPID PROFILE; Future  - COMP METABOLIC PANEL; Future  - CBC WITH DIFFERENTIAL; Future  - TSH; Future  - VITAMIN D,25 HYDROXY; Future    4. Dyslipidemia  We will do follow-up lipid panel. She is managing this with her own efforts and with red yeast rice only. She has refused statins.  - LIPID PROFILE; Future  - COMP METABOLIC PANEL; Future  - CBC WITH DIFFERENTIAL; Future  - TSH; Future  - VITAMIN D,25 HYDROXY; Future    5. Osteopenia of left lower leg  Continue calcium and vitamin D supplementation. We will check vitamin D level next visit.  - DS-BONE DENSITY STUDY (DEXA); Future  - LIPID PROFILE; Future  - COMP METABOLIC PANEL; Future  - CBC WITH DIFFERENTIAL; Future  - TSH; Future  - VITAMIN D,25 HYDROXY; Future      Please note that this dictation was created using voice recognition software. I have worked with consultants from the vendor as well as technical experts  from [facility] to optimize the interface. I have made every reasonable attempt to correct obvious errors, but I expect that there are errors of grammar and possibly content I did not discover before finalizing the note.

## 2017-11-14 ENCOUNTER — APPOINTMENT (OUTPATIENT)
Dept: PULMONOLOGY | Facility: HOSPICE | Age: 72
End: 2017-11-14
Payer: MEDICARE

## 2017-12-05 ENCOUNTER — HOSPITAL ENCOUNTER (OUTPATIENT)
Dept: RADIOLOGY | Facility: MEDICAL CENTER | Age: 72
End: 2017-12-05
Attending: FAMILY MEDICINE
Payer: MEDICARE

## 2017-12-05 DIAGNOSIS — M85.862 OSTEOPENIA OF LEFT LOWER LEG: ICD-10-CM

## 2017-12-05 PROCEDURE — 77080 DXA BONE DENSITY AXIAL: CPT

## 2018-04-10 ENCOUNTER — OFFICE VISIT (OUTPATIENT)
Dept: MEDICAL GROUP | Facility: PHYSICIAN GROUP | Age: 73
End: 2018-04-10
Payer: MEDICARE

## 2018-04-10 VITALS
HEIGHT: 59 IN | WEIGHT: 141 LBS | SYSTOLIC BLOOD PRESSURE: 128 MMHG | TEMPERATURE: 98.1 F | HEART RATE: 83 BPM | OXYGEN SATURATION: 97 % | BODY MASS INDEX: 28.43 KG/M2 | DIASTOLIC BLOOD PRESSURE: 64 MMHG

## 2018-04-10 DIAGNOSIS — K64.4 EXTERNAL HEMORRHOID: ICD-10-CM

## 2018-04-10 DIAGNOSIS — Z76.89 ENCOUNTER TO ESTABLISH CARE: ICD-10-CM

## 2018-04-10 DIAGNOSIS — E89.0 POSTOPERATIVE HYPOTHYROIDISM: ICD-10-CM

## 2018-04-10 DIAGNOSIS — Z12.31 VISIT FOR SCREENING MAMMOGRAM: ICD-10-CM

## 2018-04-10 DIAGNOSIS — R13.19 ESOPHAGEAL DYSPHAGIA: ICD-10-CM

## 2018-04-10 DIAGNOSIS — J43.9 PULMONARY EMPHYSEMA, UNSPECIFIED EMPHYSEMA TYPE (HCC): ICD-10-CM

## 2018-04-10 PROBLEM — R13.13 DYSPHAGIA, PHARYNGEAL: Status: ACTIVE | Noted: 2018-04-10

## 2018-04-10 PROCEDURE — 99214 OFFICE O/P EST MOD 30 MIN: CPT | Performed by: NURSE PRACTITIONER

## 2018-04-10 ASSESSMENT — PATIENT HEALTH QUESTIONNAIRE - PHQ9
SUM OF ALL RESPONSES TO PHQ QUESTIONS 1-9: 9
CLINICAL INTERPRETATION OF PHQ2 SCORE: 2
5. POOR APPETITE OR OVEREATING: 0 - NOT AT ALL

## 2018-04-10 NOTE — ASSESSMENT & PLAN NOTE
Chronic problem since partial left thyroidectomy with Dr. Silva Sept 2005. She f/u with ENT and she was given prescription for omeprazole. She never took this.   Had barium swallow completed August 2017 that revealed minimal GERD and small hiatal hernia more noticeable with Valsalva maneuver. She was prescribed ranitidine at this time and has used 2/3 of prescription. She required education on this today as she did not understand results.   Symptoms have slightly improved, but are ongoing.

## 2018-04-10 NOTE — ASSESSMENT & PLAN NOTE
Reports having external hemorrhoid for about 15 years. It sometimes makes it difficult for her to have BM. It does not cause pain. It has intermittently had very small amount of blood.

## 2018-04-10 NOTE — ASSESSMENT & PLAN NOTE
Ongoing problem s/p left thyroidectomy. Managed with [place] Thyroid 60 mg for 2 days alternating with 30 mg every third day. Has labs already ordered for f/u.   Ref. Range 4/4/2017 15:57 8/1/2017 09:32   TSH Latest Ref Range: 0.300 - 3.700 uIU/mL 0.210 (L) 0.470   Free T-4 Latest Ref Range: 0.53 - 1.43 ng/dL 0.83    T3,Free Latest Ref Range: 2.40 - 4.20 pg/mL 2.89

## 2018-04-11 NOTE — PROGRESS NOTES
Chief Complaint   Patient presents with   • Establish Care     new pcp   • Hernia       HPI:    Cait Pierre is a 73 y.o. female here to establish care  Esophageal dysphagia  Chronic problem since partial left thyroidectomy with Dr. Silva Sept 2005. She f/u with ENT and she was given prescription for omeprazole. She never took this.   Had barium swallow completed August 2017 that revealed minimal GERD and small hiatal hernia more noticeable with Valsalva maneuver. She was prescribed ranitidine at this time and has used 2/3 of prescription. She required education on this today as she did not understand results.   Symptoms have slightly improved, but are ongoing.    Pulmonary emphysema (CMS-[facility])  2017 PFT revealed FEV1/FVC 83%. She does not have a diagnosis of COPD.     Hypothyroidism  Ongoing problem s/p left thyroidectomy. Managed with [place] Thyroid 60 mg for 2 days alternating with 30 mg every third day. Has labs already ordered for f/u.   Ref. Range 4/4/2017 15:57 8/1/2017 09:32   TSH Latest Ref Range: 0.300 - 3.700 uIU/mL 0.210 (L) 0.470   Free T-4 Latest Ref Range: 0.53 - 1.43 ng/dL 0.83    T3,Free Latest Ref Range: 2.40 - 4.20 pg/mL 2.89        External hemorrhoid  Reports having external hemorrhoid for about 15 years. It sometimes makes it difficult for her to have BM. It does not cause pain. It has intermittently had very small amount of blood.     Current medicines (including changes today)  Current Outpatient Prescriptions   Medication Sig Dispense Refill   • coenzyme Q-10 30 MG capsule Take 60 mg by mouth every day.     • ARMOUR THYROID 60 MG Tab TAKE ONE TABLET BY MOUTH ONCE DAILY 30 Tab 5   • Probiotic Product (PROBIOTIC DAILY PO) Take  by mouth every day.     • ranitidine (ZANTAC) 150 MG Tab Take 1 Tab by mouth 2 times a day. 60 Tab 2     No current facility-administered medications for this visit.      She  has a past medical history of Anesthesia; Bronchitis; Cold (8/3/16); Dental  "disorder; H/O partial thyroidectomy (); Hyperlipidemia; Hypothyroidism (2012); Multiple thyroid nodules; Osteopenia; and Tonsillitis.  She  has a past surgical history that includes other orthopedic surgery (); colonoscopy (); thyroidectomy (2012); microlaryngoscopy (N/A, 2016); vocal cord stripping (Right, 2016); and lesion excision general (Left, 2016).  Social History   Substance Use Topics   • Smoking status: Former Smoker     Packs/day: 0.50     Years: 26.00     Types: Cigarettes     Quit date: 1986   • Smokeless tobacco: Never Used   • Alcohol use 0.6 oz/week     1 Cans of beer per week     Social History     Social History Narrative    Newly  as of .  prior to this. Retired from 31 years of working as .      Family History   Problem Relation Age of Onset   • Hypertension Mother    • Heart Disease Mother      CHF   • Alcohol/Drug Father    • Arthritis Father      Gout   • Stroke Father    • Psychiatry Brother      Schizophrenia   • Alcohol/Drug Brother    • Cancer Brother      Multiple Myeloma     Family Status   Relation Status   • Mother    • Father  at age 71    CVA   • Brother Alive   • Brother Alive   • Brother  at age 61   • Brother Alive     Health Maintenance Topics with due status: Overdue       Topic Date Due    Annual Wellness Visit 2017    MAMMOGRAM 02/10/2018        ROS  Gen: No weight loss or fatigue  Neuro: No unusual headache or dizziness  CV: No chest pain or MEAD  Pulm: No sob, cough, dyspnea, or wheezing     Objective:     Blood pressure 128/64, pulse 83, temperature 36.7 °C (98.1 °F), height 1.499 m (4' 11\"), weight 64 kg (141 lb), last menstrual period 2001, SpO2 97 %. Body mass index is 28.48 kg/m².  Physical Exam:  Alert, oriented in no acute distress.  Eye contact is good, speech goal directed, affect bright.  HEENT: EOMI, conjunctiva non-injected, sclera non-icteric. No lid edema or " eye drainage  Gross hearing intact   Neck: supple with no cervical or supraclavicular lymphadenopathy, or thyromegaly.  Lower extremities color normal, vascularity normal, no edema  Skin: No rashes or lesions in visible areas  Neuro: CNs grossly intact. Gait steady        Assessment and Plan:   Assessment/Plan:  1. Encounter to establish care    2. Esophageal dysphagia  Ongoing despite use of H2 blocker. Barium swallow with hiatal hernia and only minimal GERD. Refer to discuss if endoscopy necessary   - REFERRAL TO GASTROENTEROLOGY    3. Pulmonary emphysema, unspecified emphysema type (CMS-HCC)  Not a true diagnosis. This has been resolved in her chart.     4. Postoperative hypothyroidism  Status unknown. Check labs (TSH T4 already ordered)  - TRIIDOTHYRONINE; Future    5. External hemorrhoid  - REFERRAL TO GASTROENTEROLOGY    6. Visit for screening mammogram  - MA-SCREEN MAMMO W/CAD-BILAT; Future    The total time spent seeing the patient in consultation, and formulating an action plan for this visit was 30 minutes.  Greater than 50% of this time was spent face to face counseling, discussing problems documented above, coordinating care and answering questions by the provider.          Follow up:  Return pending labs, or 6 months.    Educated in proper administration of medication(s) ordered today including safety, possible SE, risks, benefits, rationale and alternatives to therapy.   Supportive care, differential diagnoses, and indications for immediate follow-up discussed with patient.    Pathogenesis of diagnosis discussed including typical length and natural progression.    Instructed to return to clinic or nearest emergency department for any change in condition, further concerns, or worsening of symptoms.  Patient states understanding of the plan of care and discharge instructions.    Please note that this dictation was created using voice recognition software. I have made every reasonable attempt to correct  obvious errors, but I expect that there are errors of grammar and possibly content that I did not discover before finalizing the note.    Followup: Return pending labs, or 6 months. sooner should new symptoms or problems arise.

## 2018-04-16 ENCOUNTER — HOSPITAL ENCOUNTER (OUTPATIENT)
Dept: LAB | Facility: MEDICAL CENTER | Age: 73
End: 2018-04-16
Attending: FAMILY MEDICINE
Payer: MEDICARE

## 2018-04-16 ENCOUNTER — HOSPITAL ENCOUNTER (OUTPATIENT)
Dept: LAB | Facility: MEDICAL CENTER | Age: 73
End: 2018-04-16
Attending: NURSE PRACTITIONER
Payer: MEDICARE

## 2018-04-16 DIAGNOSIS — E78.5 DYSLIPIDEMIA: ICD-10-CM

## 2018-04-16 DIAGNOSIS — E04.2 MULTIPLE THYROID NODULES: ICD-10-CM

## 2018-04-16 DIAGNOSIS — M85.862 OSTEOPENIA OF LEFT LOWER LEG: ICD-10-CM

## 2018-04-16 DIAGNOSIS — E89.0 POSTOPERATIVE HYPOTHYROIDISM: ICD-10-CM

## 2018-04-16 DIAGNOSIS — E03.9 HYPOTHYROIDISM, UNSPECIFIED TYPE: ICD-10-CM

## 2018-04-16 LAB
25(OH)D3 SERPL-MCNC: 27 NG/ML (ref 30–100)
ALBUMIN SERPL BCP-MCNC: 4.2 G/DL (ref 3.2–4.9)
ALBUMIN/GLOB SERPL: 1.4 G/DL
ALP SERPL-CCNC: 106 U/L (ref 30–99)
ALT SERPL-CCNC: 11 U/L (ref 2–50)
ANION GAP SERPL CALC-SCNC: 6 MMOL/L (ref 0–11.9)
AST SERPL-CCNC: 17 U/L (ref 12–45)
BASOPHILS # BLD AUTO: 0.9 % (ref 0–1.8)
BASOPHILS # BLD: 0.05 K/UL (ref 0–0.12)
BILIRUB SERPL-MCNC: 0.4 MG/DL (ref 0.1–1.5)
BUN SERPL-MCNC: 13 MG/DL (ref 8–22)
CALCIUM SERPL-MCNC: 9.3 MG/DL (ref 8.5–10.5)
CHLORIDE SERPL-SCNC: 99 MMOL/L (ref 96–112)
CHOLEST SERPL-MCNC: 248 MG/DL (ref 100–199)
CO2 SERPL-SCNC: 26 MMOL/L (ref 20–33)
CREAT SERPL-MCNC: 0.9 MG/DL (ref 0.5–1.4)
EOSINOPHIL # BLD AUTO: 0.08 K/UL (ref 0–0.51)
EOSINOPHIL NFR BLD: 1.5 % (ref 0–6.9)
ERYTHROCYTE [DISTWIDTH] IN BLOOD BY AUTOMATED COUNT: 48.8 FL (ref 35.9–50)
GLOBULIN SER CALC-MCNC: 3 G/DL (ref 1.9–3.5)
GLUCOSE SERPL-MCNC: 95 MG/DL (ref 65–99)
HCT VFR BLD AUTO: 40.1 % (ref 37–47)
HDLC SERPL-MCNC: 46 MG/DL
HGB BLD-MCNC: 12.8 G/DL (ref 12–16)
IMM GRANULOCYTES # BLD AUTO: 0.02 K/UL (ref 0–0.11)
IMM GRANULOCYTES NFR BLD AUTO: 0.4 % (ref 0–0.9)
LDLC SERPL CALC-MCNC: 170 MG/DL
LYMPHOCYTES # BLD AUTO: 1.55 K/UL (ref 1–4.8)
LYMPHOCYTES NFR BLD: 28.4 % (ref 22–41)
MCH RBC QN AUTO: 30.6 PG (ref 27–33)
MCHC RBC AUTO-ENTMCNC: 31.9 G/DL (ref 33.6–35)
MCV RBC AUTO: 95.9 FL (ref 81.4–97.8)
MONOCYTES # BLD AUTO: 0.48 K/UL (ref 0–0.85)
MONOCYTES NFR BLD AUTO: 8.8 % (ref 0–13.4)
NEUTROPHILS # BLD AUTO: 3.27 K/UL (ref 2–7.15)
NEUTROPHILS NFR BLD: 60 % (ref 44–72)
NRBC # BLD AUTO: 0 K/UL
NRBC BLD-RTO: 0 /100 WBC
PLATELET # BLD AUTO: 345 K/UL (ref 164–446)
PMV BLD AUTO: 9.4 FL (ref 9–12.9)
POTASSIUM SERPL-SCNC: 4.2 MMOL/L (ref 3.6–5.5)
PROT SERPL-MCNC: 7.2 G/DL (ref 6–8.2)
RBC # BLD AUTO: 4.18 M/UL (ref 4.2–5.4)
SODIUM SERPL-SCNC: 131 MMOL/L (ref 135–145)
T3 SERPL-MCNC: 88.6 NG/DL (ref 60–181)
TRIGL SERPL-MCNC: 159 MG/DL (ref 0–149)
TSH SERPL DL<=0.005 MIU/L-ACNC: 1.32 UIU/ML (ref 0.38–5.33)
WBC # BLD AUTO: 5.5 K/UL (ref 4.8–10.8)

## 2018-04-16 PROCEDURE — 82306 VITAMIN D 25 HYDROXY: CPT

## 2018-04-16 PROCEDURE — 84443 ASSAY THYROID STIM HORMONE: CPT

## 2018-04-16 PROCEDURE — 36415 COLL VENOUS BLD VENIPUNCTURE: CPT

## 2018-04-16 PROCEDURE — 85025 COMPLETE CBC W/AUTO DIFF WBC: CPT

## 2018-04-16 PROCEDURE — 80061 LIPID PANEL: CPT

## 2018-04-16 PROCEDURE — 80053 COMPREHEN METABOLIC PANEL: CPT

## 2018-04-16 PROCEDURE — 84480 ASSAY TRIIODOTHYRONINE (T3): CPT

## 2018-04-25 ENCOUNTER — HOSPITAL ENCOUNTER (OUTPATIENT)
Dept: RADIOLOGY | Facility: MEDICAL CENTER | Age: 73
End: 2018-04-25
Attending: NURSE PRACTITIONER
Payer: MEDICARE

## 2018-04-25 ENCOUNTER — HOSPITAL ENCOUNTER (OUTPATIENT)
Dept: RADIOLOGY | Facility: MEDICAL CENTER | Age: 73
End: 2018-04-25
Attending: INTERNAL MEDICINE
Payer: MEDICARE

## 2018-04-25 DIAGNOSIS — E87.1 HYPONATREMIA: ICD-10-CM

## 2018-04-25 DIAGNOSIS — R09.89 ABNORMAL CHEST SOUNDS: ICD-10-CM

## 2018-04-25 DIAGNOSIS — K44.9 HIATAL HERNIA: ICD-10-CM

## 2018-04-25 DIAGNOSIS — R12 HEARTBURN: ICD-10-CM

## 2018-04-25 DIAGNOSIS — Z12.31 VISIT FOR SCREENING MAMMOGRAM: ICD-10-CM

## 2018-04-25 PROCEDURE — 74018 RADEX ABDOMEN 1 VIEW: CPT

## 2018-04-25 PROCEDURE — 77063 BREAST TOMOSYNTHESIS BI: CPT

## 2018-05-15 RX ORDER — THYROID 60 MG
TABLET ORAL
Qty: 90 TAB | Refills: 3 | Status: SHIPPED | OUTPATIENT
Start: 2018-05-15 | End: 2019-05-21 | Stop reason: SDUPTHER

## 2018-08-01 ENCOUNTER — APPOINTMENT (RX ONLY)
Dept: URBAN - METROPOLITAN AREA CLINIC 20 | Facility: CLINIC | Age: 73
Setting detail: DERMATOLOGY
End: 2018-08-01

## 2018-08-01 DIAGNOSIS — D18.0 HEMANGIOMA: ICD-10-CM

## 2018-08-01 DIAGNOSIS — D22 MELANOCYTIC NEVI: ICD-10-CM

## 2018-08-01 DIAGNOSIS — L82.0 INFLAMED SEBORRHEIC KERATOSIS: ICD-10-CM

## 2018-08-01 DIAGNOSIS — L81.4 OTHER MELANIN HYPERPIGMENTATION: ICD-10-CM

## 2018-08-01 DIAGNOSIS — L82.1 OTHER SEBORRHEIC KERATOSIS: ICD-10-CM

## 2018-08-01 DIAGNOSIS — Z71.89 OTHER SPECIFIED COUNSELING: ICD-10-CM

## 2018-08-01 PROBLEM — D22.5 MELANOCYTIC NEVI OF TRUNK: Status: ACTIVE | Noted: 2018-08-01

## 2018-08-01 PROBLEM — E03.9 HYPOTHYROIDISM, UNSPECIFIED: Status: ACTIVE | Noted: 2018-08-01

## 2018-08-01 PROBLEM — D22.61 MELANOCYTIC NEVI OF RIGHT UPPER LIMB, INCLUDING SHOULDER: Status: ACTIVE | Noted: 2018-08-01

## 2018-08-01 PROBLEM — D22.62 MELANOCYTIC NEVI OF LEFT UPPER LIMB, INCLUDING SHOULDER: Status: ACTIVE | Noted: 2018-08-01

## 2018-08-01 PROBLEM — D48.5 NEOPLASM OF UNCERTAIN BEHAVIOR OF SKIN: Status: ACTIVE | Noted: 2018-08-01

## 2018-08-01 PROBLEM — D18.01 HEMANGIOMA OF SKIN AND SUBCUTANEOUS TISSUE: Status: ACTIVE | Noted: 2018-08-01

## 2018-08-01 PROCEDURE — 99203 OFFICE O/P NEW LOW 30 MIN: CPT | Mod: 25

## 2018-08-01 PROCEDURE — ? COUNSELING

## 2018-08-01 PROCEDURE — 11101: CPT

## 2018-08-01 PROCEDURE — ? LIQUID NITROGEN

## 2018-08-01 PROCEDURE — ? BIOPSY BY SHAVE METHOD

## 2018-08-01 PROCEDURE — 11100: CPT | Mod: 59

## 2018-08-01 PROCEDURE — ? SUNSCREEN RECOMMENDATIONS

## 2018-08-01 PROCEDURE — 17110 DESTRUCTION B9 LES UP TO 14: CPT

## 2018-08-01 ASSESSMENT — LOCATION SIMPLE DESCRIPTION DERM
LOCATION SIMPLE: LEFT SHOULDER
LOCATION SIMPLE: UPPER BACK
LOCATION SIMPLE: CHEST
LOCATION SIMPLE: RIGHT HAND
LOCATION SIMPLE: RIGHT UPPER BACK
LOCATION SIMPLE: LEFT HAND
LOCATION SIMPLE: RIGHT FOREARM
LOCATION SIMPLE: RIGHT FOREHEAD
LOCATION SIMPLE: LEFT FOREARM

## 2018-08-01 ASSESSMENT — LOCATION DETAILED DESCRIPTION DERM
LOCATION DETAILED: SUPERIOR THORACIC SPINE
LOCATION DETAILED: RIGHT LATERAL SUPERIOR CHEST
LOCATION DETAILED: LEFT VENTRAL PROXIMAL FOREARM
LOCATION DETAILED: LEFT RADIAL DORSAL HAND
LOCATION DETAILED: RIGHT RADIAL DORSAL HAND
LOCATION DETAILED: RIGHT INFERIOR UPPER BACK
LOCATION DETAILED: RIGHT PROXIMAL DORSAL FOREARM
LOCATION DETAILED: RIGHT INFERIOR MEDIAL FOREHEAD
LOCATION DETAILED: INFERIOR THORACIC SPINE
LOCATION DETAILED: RIGHT VENTRAL DISTAL FOREARM
LOCATION DETAILED: RIGHT MEDIAL UPPER BACK
LOCATION DETAILED: LEFT POSTERIOR SHOULDER
LOCATION DETAILED: RIGHT SUPERIOR UPPER BACK
LOCATION DETAILED: LEFT PROXIMAL DORSAL FOREARM

## 2018-08-01 ASSESSMENT — LOCATION ZONE DERM
LOCATION ZONE: FACE
LOCATION ZONE: HAND
LOCATION ZONE: TRUNK
LOCATION ZONE: ARM

## 2018-08-01 NOTE — PROCEDURE: LIQUID NITROGEN
Post-Care Instructions: I reviewed with the patient in detail post-care instructions. Patient is to wear sunprotection, and avoid picking at any of the treated lesions. Pt may apply Vaseline to crusted or scabbing areas.
Consent: The patient's consent was obtained including but not limited to risks of crusting, scabbing, blistering, scarring, darker or lighter pigmentary change, recurrence, incomplete removal and infection.
Detail Level: Detailed
Render Post-Care Instructions In Note?: no
Medical Necessity Clause: This procedure was medically necessary because the lesions that were treated were:
Medical Necessity Information: It is in your best interest to select a reason for this procedure from the list below. All of these items fulfill various CMS LCD requirements except the new and changing color options.

## 2018-08-01 NOTE — PROCEDURE: BIOPSY BY SHAVE METHOD
Lab: [id number]
X Size Of Lesion In Cm: 0.4
Type Of Destruction Used: Curettage
Bill For Surgical Tray: no
Hemostasis: Drysol and Electrocautery
Additional Anesthesia Volume In Cc (Will Not Render If 0): 0
Detail Level: Detailed
Anesthesia Type: 1% lidocaine with 1:100,000 epinephrine and a 1:12 solution of 8.4% sodium bicarbonate
Post-Care Instructions: I reviewed with the patient in detail post-care instructions. Patient is to keep the biopsy site clean once daily and then apply petroleum and bandaid  until healed.
Consent: Written consent was obtained and risks were reviewed including but not limited to scarring, infection, bleeding, scabbing, incomplete removal, nerve damage and allergy to anesthesia.
Render Post-Care Instructions In Note?: yes
Biopsy Type: H and E
Dressing: Band-Aid
Wound Care: Bacitracin
Lab Facility: 
Notification Instructions: Patient will be notified of biopsy results. However, patient instructed to call the office if not contacted within 2 weeks.
Billing Type: Third-Party Bill
Depth Of Biopsy: dermis
Biopsy Method: Personna blade
Anesthesia Volume In Cc: 1
Size Of Lesion In Cm: 0.9
Size Of Lesion In Cm: 0.5
X Size Of Lesion In Cm: 0.3

## 2019-01-10 ENCOUNTER — HOSPITAL ENCOUNTER (OUTPATIENT)
Dept: RADIOLOGY | Facility: MEDICAL CENTER | Age: 74
End: 2019-01-10
Attending: PHYSICIAN ASSISTANT
Payer: MEDICARE

## 2019-01-10 ENCOUNTER — OFFICE VISIT (OUTPATIENT)
Dept: URGENT CARE | Facility: PHYSICIAN GROUP | Age: 74
End: 2019-01-10
Payer: MEDICARE

## 2019-01-10 VITALS
HEART RATE: 94 BPM | DIASTOLIC BLOOD PRESSURE: 70 MMHG | OXYGEN SATURATION: 95 % | HEIGHT: 59 IN | TEMPERATURE: 98.8 F | SYSTOLIC BLOOD PRESSURE: 122 MMHG | WEIGHT: 141 LBS | BODY MASS INDEX: 28.43 KG/M2

## 2019-01-10 DIAGNOSIS — S93.421A SPRAIN OF DELTOID LIGAMENT OF RIGHT ANKLE, INITIAL ENCOUNTER: ICD-10-CM

## 2019-01-10 DIAGNOSIS — S99.911A INJURY OF RIGHT ANKLE, INITIAL ENCOUNTER: ICD-10-CM

## 2019-01-10 PROCEDURE — 99214 OFFICE O/P EST MOD 30 MIN: CPT | Performed by: PHYSICIAN ASSISTANT

## 2019-01-10 PROCEDURE — 73610 X-RAY EXAM OF ANKLE: CPT | Mod: RT

## 2019-01-14 ASSESSMENT — ENCOUNTER SYMPTOMS
SHORTNESS OF BREATH: 0
DIARRHEA: 0
VOMITING: 0
TINGLING: 0
COUGH: 0
FEVER: 0
CHILLS: 0
INABILITY TO BEAR WEIGHT: 0
DIZZINESS: 0
ABDOMINAL PAIN: 0
LOSS OF SENSATION: 0
NAUSEA: 0
LOSS OF MOTION: 0

## 2019-01-14 NOTE — PROGRESS NOTES
"Subjective:      Cait Pierre is a 73 y.o. female who presents with Ankle Injury (x1 month right ankle )            Ankle Injury    The incident occurred more than 1 week ago (3 weeks). The incident occurred at home. The injury mechanism was an eversion injury. The quality of the pain is described as aching. The pain is at a severity of 3/10. The pain is mild. The pain has been constant since onset. Pertinent negatives include no inability to bear weight, loss of motion, loss of sensation or tingling. The symptoms are aggravated by weight bearing. She has tried nothing for the symptoms.       Review of Systems   Constitutional: Negative for chills and fever.   HENT: Negative for congestion.    Respiratory: Negative for cough and shortness of breath.    Cardiovascular: Negative for chest pain.   Gastrointestinal: Negative for abdominal pain, diarrhea, nausea and vomiting.   Genitourinary: Negative.    Musculoskeletal:        + ankle pain   Skin: Negative for rash.   Neurological: Negative for dizziness and tingling.        Objective:     /70 (BP Location: Left arm, Patient Position: Sitting)   Pulse 94   Temp 37.1 °C (98.8 °F) (Temporal)   Ht 1.499 m (4' 11\")   Wt 64 kg (141 lb)   LMP 01/01/2001   SpO2 95%   BMI 28.48 kg/m²      Physical Exam   Constitutional: She is oriented to person, place, and time. She appears well-developed and well-nourished. No distress.   HENT:   Head: Normocephalic and atraumatic.   Eyes: Pupils are equal, round, and reactive to light.   Neck: Normal range of motion.   Cardiovascular: Normal rate.    Pulmonary/Chest: Effort normal.   Musculoskeletal: Normal range of motion.        Right ankle: She exhibits normal range of motion, no swelling and no ecchymosis. Tenderness. Medial malleolus tenderness found. No head of 5th metatarsal and no proximal fibula tenderness found. Achilles tendon exhibits no pain.   Neurological: She is alert and oriented to person, place, " and time.   Skin: Skin is warm and dry. She is not diaphoretic.   Psychiatric: She has a normal mood and affect. Her behavior is normal.   Nursing note and vitals reviewed.          PMH:  has a past medical history of Anesthesia; Bronchitis; Cold (8/3/16); Dental disorder; H/O partial thyroidectomy (2012); Hyperlipidemia; Hypothyroidism (12/18/2012); Multiple thyroid nodules; Osteopenia; and Tonsillitis.  MEDS:   Current Outpatient Prescriptions:   •  ARMOUR THYROID 60 MG Tab, TAKE ONE TABLET BY MOUTH ONCE DAILY, Disp: 90 Tab, Rfl: 3  •  coenzyme Q-10 30 MG capsule, Take 60 mg by mouth every day., Disp: , Rfl:   •  Probiotic Product (PROBIOTIC DAILY PO), Take  by mouth every day., Disp: , Rfl:   •  ranitidine (ZANTAC) 150 MG Tab, Take 1 Tab by mouth 2 times a day., Disp: 60 Tab, Rfl: 2  ALLERGIES:   Allergies   Allergen Reactions   • Levaquin      Headache, Dizzy     SURGHX:   Past Surgical History:   Procedure Laterality Date   • MICROLARYNGOSCOPY N/A 9/9/2016    Procedure: MICROLARYNGOSCOPY - DIRECT;  Surgeon: Brent Silva M.D.;  Location: SURGERY SAME DAY [facility];  Service:    • VOCAL CORD STRIPPING Right 9/9/2016    Procedure: VOCAL CORD STRIPPING - TRUE;  Surgeon: Brent Silva M.D.;  Location: SURGERY SAME DAY [facility];  Service:    • LESION EXCISION GENERAL Left 9/9/2016    Procedure: LESION EXCISION GENERAL - TONGUE BASE;  Surgeon: Brent Silva M.D.;  Location: SURGERY SAME DAY [facility];  Service:    • THYROIDECTOMY  9/28/2012    Performed by Brent Silva M.D. at SURGERY SAME DAY [facility] ORS   • COLONOSCOPY  9/12   • OTHER ORTHOPEDIC SURGERY  1977    Partial Finger tip amputation from accident     SOCHX:  reports that she quit smoking about 33 years ago. Her smoking use included Cigarettes. She has a 13.00 pack-year smoking history. She has never used smokeless tobacco. She reports that she drinks about 0.6 oz of alcohol per week . She reports that she does not use  drugs.  FH: family history includes Alcohol/Drug in her brother and father; Arthritis in her father; Cancer in her brother; Heart Disease in her mother; Hypertension in her mother; Psychiatry in her brother; Stroke in her father.    Assessment/Plan:     1. Sprain of deltoid ligament of right ankle, initial encounter  - DX-ANKLE 3+ VIEWS RIGHT; Future  Impression       1.  Unremarkable right ankle series.     Encouraged icing 2-3 times daily and OTC nsaids as needed for pain. Avoid heavy lifting or strenuous activities. RTC or follow up with primary care if symptoms persist/worsen after 1-2 weeks. The patient demonstrated a good understanding and agreed with the treatment plan.

## 2019-02-12 ENCOUNTER — PATIENT OUTREACH (OUTPATIENT)
Dept: HEALTH INFORMATION MANAGEMENT | Facility: OTHER | Age: 74
End: 2019-02-12

## 2019-02-12 NOTE — PROGRESS NOTES
Outcome: Left Message    Please transfer to Patient Outreach Team at [phone] when patient returns call.    WebIZ Checked & Epic Updated:  yes    [facility]Connect Verified: yes    Attempt # 1

## 2019-02-12 NOTE — PROGRESS NOTES
1. Attempt #:1    2. HealthConnect Verified: yes    3. Verify PCP: yes    4. Review Care Team: yes    5. WebIZ Checked & Epic Updated: Yes  · WebIZ Recommendations: FLU and SHINGRIX (Shingles)  · Is patient due for Tdap? NO  · Is patient due for Shingles? YES. Patient was not notified of copay/out of pocket cost.    6. Reviewed/Updated the following with patient:       •   Communication Preference Obtained? YES       •   Preferred Pharmacy? YES       •   Preferred Lab? YES       •   Family History (document living status of immediate family members and if + hx of cancer, diabetes, hypertension, hyperlipidemia, heart attack, stroke) YES    7. Annual Wellness Visit Scheduling  · Scheduling Status:Scheduled     8. Care Gap Scheduling (Attempt to Schedule EACH Overdue Care Gap!)     Health Maintenance Due   Topic Date Due   • IMM ZOSTER VACCINES (1 of 2) 03/15/1997   • Annual Wellness Visit  03/08/2017   • IMM INFLUENZA (1) 09/01/2018        Scheduled patient for Annual Wellness Visit     9. [employer] Activation: already active    10. [employer] Debra: no    11. Virtual Visits: no    12. Opt In to Text Messages: yes    13. Patient was advised: “This is a free wellness visit. The provider will screen for medical conditions to help you stay healthy. If you have other concerns to address you may be asked to discuss these at a separate visit or there may be an additional fee.”     14. Patient was informed to arrive 15 min prior to their scheduled appointment and bring in their medication bottles.

## 2019-02-13 ENCOUNTER — OFFICE VISIT (OUTPATIENT)
Dept: MEDICAL GROUP | Facility: PHYSICIAN GROUP | Age: 74
End: 2019-02-13
Payer: MEDICARE

## 2019-02-13 VITALS
HEIGHT: 59 IN | OXYGEN SATURATION: 97 % | HEART RATE: 81 BPM | BODY MASS INDEX: 29.43 KG/M2 | TEMPERATURE: 97.7 F | WEIGHT: 146 LBS | SYSTOLIC BLOOD PRESSURE: 124 MMHG | DIASTOLIC BLOOD PRESSURE: 78 MMHG

## 2019-02-13 DIAGNOSIS — R13.13 PHARYNGEAL DYSPHAGIA: ICD-10-CM

## 2019-02-13 DIAGNOSIS — K64.4 EXTERNAL HEMORRHOID: ICD-10-CM

## 2019-02-13 DIAGNOSIS — Z00.00 MEDICARE ANNUAL WELLNESS VISIT, SUBSEQUENT: ICD-10-CM

## 2019-02-13 DIAGNOSIS — E78.5 HYPERLIPIDEMIA WITH TARGET LDL LESS THAN 130: ICD-10-CM

## 2019-02-13 DIAGNOSIS — E89.0 POSTOPERATIVE HYPOTHYROIDISM: ICD-10-CM

## 2019-02-13 DIAGNOSIS — E04.2 MULTIPLE THYROID NODULES: ICD-10-CM

## 2019-02-13 DIAGNOSIS — M85.862 OSTEOPENIA OF LEFT LOWER LEG: ICD-10-CM

## 2019-02-13 DIAGNOSIS — E55.9 VITAMIN D DEFICIENCY DISEASE: ICD-10-CM

## 2019-02-13 DIAGNOSIS — Z00.00 PREVENTATIVE HEALTH CARE: ICD-10-CM

## 2019-02-13 PROCEDURE — G0439 PPPS, SUBSEQ VISIT: HCPCS | Performed by: NURSE PRACTITIONER

## 2019-02-13 ASSESSMENT — ENCOUNTER SYMPTOMS: GENERAL WELL-BEING: FAIR

## 2019-02-13 ASSESSMENT — PATIENT HEALTH QUESTIONNAIRE - PHQ9: CLINICAL INTERPRETATION OF PHQ2 SCORE: 0

## 2019-02-13 ASSESSMENT — ACTIVITIES OF DAILY LIVING (ADL): BATHING_REQUIRES_ASSISTANCE: 0

## 2019-02-13 NOTE — ASSESSMENT & PLAN NOTE
12/2017 bone density revealed osteopenia of left hip with T score -1.4.  She is not on any medication for treatment.  Followed by PCP due for bone density monitoring 12/2019

## 2019-02-13 NOTE — ASSESSMENT & PLAN NOTE
Chronic problem not controlled with 10-year ASCVD risk 10.8%. She is not on any treatment for this as she has been transitioned from one PCP to another, therefore labs from 4/2018 that showed uncontrolled lipids have not been addressed. Needs statin therapy and f/u lipid panel for monitoring.

## 2019-02-13 NOTE — LETTER
February 13, 2019        [facility]  [address] [facility] [place] [address]        Dear Cait:    Fat and Cholesterol Restricted Diet  High levels of fat and cholesterol in your blood may lead to various health problems, such as diseases of the heart, blood vessels, gallbladder, liver, and pancreas. Fats are concentrated sources of energy that come in various forms. Certain types of fat, including saturated fat, may be harmful in excess. Cholesterol is a substance needed by your body in small amounts. Your body makes all the cholesterol it needs. Excess cholesterol comes from the food you eat.  When you have high levels of cholesterol and saturated fat in your blood, health problems can develop because the excess fat and cholesterol will gather along the walls of your blood vessels, causing them to narrow. Choosing the right foods will help you control your intake of fat and cholesterol. This will help keep the levels of these substances in your blood within normal limits and reduce your risk of disease.  WHAT IS MY PLAN?  Your health care provider recommends that you:  · Get no more than __________ % of the total calories in your daily diet from fat.  · Limit your intake of saturated fat to less than ______% of your total calories each day.  · Limit the amount of cholesterol in your diet to less than _________mg per day.  WHAT TYPES OF FAT SHOULD I CHOOSE?  · Choose healthy fats more often. Choose monounsaturated and polyunsaturated fats, such as olive and canola oil, flaxseeds, walnuts, almonds, and seeds.  · Eat more omega-3 fats. Good choices include salmon, mackerel, sardines, tuna, flaxseed oil, and ground flaxseeds. Aim to eat fish at least two times a week.  · Limit saturated fats. Saturated fats are primarily found in animal products, such as meats, butter, and cream. Plant sources of saturated fats include palm oil, palm kernel oil, and coconut oil.  · Avoid foods with partially hydrogenated oils in  "them. These contain trans fats. Examples of foods that contain trans fats are stick margarine, some tub margarines, cookies, crackers, and other baked goods.  WHAT GENERAL GUIDELINES DO I NEED TO FOLLOW?  These guidelines for healthy eating will help you control your intake of fat and cholesterol:  · Check food labels carefully to identify foods with trans fats or high amounts of saturated fat.  · Fill one half of your plate with vegetables and green salads.  · Fill one fourth of your plate with whole grains. Look for the word \"whole\" as the first word in the ingredient list.  · Fill one fourth of your plate with lean protein foods.  · Limit fruit to two servings a day. Choose fruit instead of juice.  · Eat more foods that contain soluble fiber. Examples of foods that contain this type of fiber are apples, broccoli, carrots, beans, peas, and barley. Aim to get 20-30 g of fiber per day.  · Eat more home-cooked food and less restaurant, buffet, and fast food.  · Limit or avoid alcohol.  · Limit foods high in starch and sugar.  · Limit fried foods.  · Cook foods using methods other than frying. Baking, boiling, grilling, and broiling are all great options.  · Lose weight if you are overweight. Losing just 5-10% of your initial body weight can help your overall health and prevent diseases such as diabetes and heart disease.  WHAT FOODS CAN I EAT?  Grains  Whole grains, such as whole wheat or whole grain breads, crackers, cereals, and pasta. Unsweetened oatmeal, bulgur, barley, quinoa, or brown rice. Corn or whole wheat flour tortillas.  Vegetables  Fresh or frozen vegetables (raw, steamed, roasted, or grilled). Green salads.  Fruits  All fresh, canned (in natural juice), or frozen fruits.  Meat and Other Protein Products  Ground beef (85% or leaner), grass-fed beef, or beef trimmed of fat. Skinless chicken or turkey. Ground chicken or turkey. Pork trimmed of fat. All fish and seafood. Eggs. Dried beans, peas, or " lentils. Unsalted nuts or seeds. Unsalted canned or dry beans.  Dairy  Low-fat dairy products, such as skim or 1% milk, 2% or reduced-fat cheeses, low-fat ricotta or cottage cheese, or plain low-fat yogurt.  Fats and Oils  Tub margarines without trans fats. Light or reduced-fat mayonnaise and salad dressings. Avocado. Olive, canola, sesame, or safflower oils. Natural peanut or almond butter (choose ones without added sugar and oil).  The items listed above may not be a complete list of recommended foods or beverages. Contact your dietitian for more options.  WHAT FOODS ARE NOT RECOMMENDED?  Grains  White bread. White pasta. White rice. Cornbread. Bagels, pastries, and croissants. Crackers that contain trans fat.  Vegetables  White potatoes. Corn. Creamed or fried vegetables. Vegetables in a cheese sauce.  Fruits  Dried fruits. Canned fruit in light or heavy syrup. Fruit juice.  Meat and Other Protein Products  Fatty cuts of meat. Ribs, chicken wings, cardoza, sausage, bologna, salami, chitterlings, fatback, hot dogs, bratwurst, and packaged luncheon meats. Liver and organ meats.  Dairy  Whole or 2% milk, cream, half-and-half, and cream cheese. Whole milk cheeses. Whole-fat or sweetened yogurt. Full-fat cheeses. Nondairy creamers and whipped toppings. Processed cheese, cheese spreads, or cheese curds.  Sweets and Desserts  Corn syrup, sugars, honey, and molasses. Candy. Jam and jelly. Syrup. Sweetened cereals. Cookies, pies, cakes, donuts, muffins, and ice cream.  Fats and Oils  Butter, stick margarine, lard, shortening, ghee, or cardoza fat. Coconut, palm kernel, or palm oils.  Beverages  Alcohol. Sweetened drinks (such as sodas, lemonade, and fruit drinks or punches).      If you have any questions or concerns, please don't hesitate to call.        Sincerely,        KENNETH Diaz.    Electronically Signed

## 2019-02-13 NOTE — ASSESSMENT & PLAN NOTE
Chronic issue that has remained stable without treatment.  Last ultrasound completed for monitoring 8/2017 was stable, but due now for follow-up ultrasound for monitoring.  Followed by PCP

## 2019-02-13 NOTE — ASSESSMENT & PLAN NOTE
Chronic issue that remains stable with very occasional and mild symptoms.  Not on treatment.  Followed by PCP.  Monitor for any worsening symptoms

## 2019-02-13 NOTE — PROGRESS NOTES
Chief Complaint   Patient presents with   • Annual Wellness Visit         HPI:  Cait is a 73 y.o. here for Medicare Annual Wellness Visit    She is here for a wellness visit today, but she does have complaints about ongoing choking sensation.  This is been occurring since 2005 since she had partial thyroidectomy with ENT Dr. Silva.  She points just above the tracheal region and throat as the location of where the choking sensation occurs.  States it feels like she is choking on phlegm in her throat.  During these episodes, she will end up being able to blow mucus from her nose and coughing and hacking will improve her symptoms after a few minutes.  This only occurs about once every 6 months or so, but when she eats she continuously is coughing with eating. Last saw ENT 1 year ago and he recommended omeprazole.  Barium swallow was completed that revealed a small hiatal hernia and minimal GERD.  GI does not feel endoscopy is needed    Patient Active Problem List    Diagnosis Date Noted   • Pharyngeal dysphagia 04/10/2018   • External hemorrhoid 04/10/2018   • Multiple thyroid nodules    • Hypothyroidism 12/18/2012   • Osteopenia of left lower leg 05/29/2012   • Hyperlipidemia with target LDL less than 130 02/21/2012       Current Outpatient Prescriptions   Medication Sig Dispense Refill   • ARMOUR THYROID 60 MG Tab TAKE ONE TABLET BY MOUTH ONCE DAILY 90 Tab 3   • coenzyme Q-10 30 MG capsule Take 60 mg by mouth every day.     • Probiotic Product (PROBIOTIC DAILY PO) Take  by mouth every day.     • ranitidine (ZANTAC) 150 MG Tab Take 1 Tab by mouth 2 times a day. 60 Tab 2     No current facility-administered medications for this visit.         Patient is taking medications as noted in medication list.  Current supplements as per medication list.     Allergies: Levaquin    Current social contact/activities: Jew, spends time with family and friends     Is patient current with immunizations? No, due for FLU and  SHINGRIX (Shingles). Patient is interested in receiving NONE today.    She  reports that she quit smoking about 33 years ago. Her smoking use included Cigarettes. She has a 13.00 pack-year smoking history. She has never used smokeless tobacco. She reports that she drinks about 0.6 oz of alcohol per week . She reports that she does not use drugs.  Counseling given: Not Answered        DPA/Advanced directive: Patient does not have an Advanced Directive.  A packet and workshop information was given on Advanced Directives.    ROS:    Gait: Uses no assistive device   Ostomy: No   Other tubes: No   Amputations: No   Chronic oxygen use No   Last eye exam 2018   Wears hearing aids: No   : Denies any urinary leakage during the last 6 months      Screening:    Depression Screening    Little interest or pleasure in doing things?  0 - not at all  Feeling down, depressed, or hopeless? 0 - not at all  Patient Health Questionnaire Score: 0    If depressive symptoms identified deferred to follow up visit unless specifically addressed in assessment and plan.    Interpretation of PHQ-9 Total Score   Score Severity   1-4 No Depression   5-9 Mild Depression   10-14 Moderate Depression   15-19 Moderately Severe Depression   20-27 Severe Depression    Screening for Cognitive Impairment    Three Minute Recall (leader, season, table)  3/3 Leader, season, table  Salbador clock face with all 12 numbers and set the hands to show 10 past 11.  Yes 3 /5 time 11:10  If cognitive concerns identified, deferred for follow up unless specifically addressed in assessment and plan.    Fall Risk Assessment    Has the patient had two or more falls in the last year or any fall with injury in the last year?  No  If fall risk identified, deferred for follow up unless specifically addressed in assessment and plan.    Safety Assessment    Throw rugs on floor.  No  Handrails on all stairs.  Yes  Good lighting in all hallways.  Yes  Difficulty hearing.   No  Patient counseled about all safety risks that were identified.    Functional Assessment ADLs    Are there any barriers preventing you from cooking for yourself or meeting nutritional needs?  No.    Are there any barriers preventing you from driving safely or obtaining transportation?  No.    Are there any barriers preventing you from using a telephone or calling for help?  No.    Are there any barriers preventing you from shopping?  No.    Are there any barriers preventing you from taking care of your own finances?  No.    Are there any barriers preventing you from managing your medications?  No.    Are there any barriers preventing you from showering, bathing or dressing yourself?  No.    Are you currently engaging in any exercise or physical activity?  Yes.  House chores, walks daily for 1-2 miles  What is your perception of your health?  Fair.    Health Maintenance Summary                IMM ZOSTER VACCINES Overdue 3/15/1997     Annual Wellness Visit Overdue 3/8/2017      Done 3/7/2016 Visit Dx: Medicare annual wellness visit, subsequent     Patient has more history with this topic...    IMM INFLUENZA Overdue 9/1/2018      Patient Declined 10/12/2015     MAMMOGRAM Next Due 4/25/2019      Done 4/25/2018      Patient has more history with this topic...    BONE DENSITY Next Due 12/5/2020      Done 12/5/2017 DS-BONE DENSITY STUDY (DEXA)     Patient has more history with this topic...    IMM DTaP/Tdap/Td Vaccine Next Due 6/8/2022      Done 6/8/2012 Imm Admin: Tdap Vaccine    COLONOSCOPY Next Due 9/17/2022      Not specified 9/17/2012      Patient has more history with this topic...          Patient Care Team:  GRISEL Diaz as PCP - General (Family Medicine)  Jennifer Kilpatrick R.N. as Medical Home Care Manager  Clarence Rollins M.D. as Consulting Physician (Endocrinology)  Clarence Oneill M.D. as Consulting Physician (Gastroenterology)  Brent Silva M.D. (Otolaryngology)    Social History  "  Substance Use Topics   • Smoking status: Former Smoker     Packs/day: 0.50     Years: 26.00     Types: Cigarettes     Quit date: 1/1/1986   • Smokeless tobacco: Never Used   • Alcohol use 0.6 oz/week     1 Cans of beer per week     Family History   Problem Relation Age of Onset   • Hypertension Mother    • Heart Failure Mother    • Heart Disease Mother    • Alcohol/Drug Father    • Arthritis Father         Gout   • Stroke Father    • Psychiatry Brother         Schizophrenia   • Alcohol/Drug Brother    • Cancer Brother         Multiple Myeloma     She  has a past medical history of Anesthesia; Bronchitis; Cold (8/3/16); Dental disorder; H/O partial thyroidectomy (2012); Hyperlipidemia; Hypothyroidism (12/18/2012); Multiple thyroid nodules; Osteopenia; Sosa's node; and Tonsillitis.   Past Surgical History:   Procedure Laterality Date   • MICROLARYNGOSCOPY N/A 9/9/2016    Procedure: MICROLARYNGOSCOPY - DIRECT;  Surgeon: Brent Silva M.D.;  Location: SURGERY SAME DAY [facility];  Service:    • VOCAL CORD STRIPPING Right 9/9/2016    Procedure: VOCAL CORD STRIPPING - TRUE;  Surgeon: Brent Silva M.D.;  Location: SURGERY SAME DAY [facility];  Service:    • LESION EXCISION GENERAL Left 9/9/2016    Procedure: LESION EXCISION GENERAL - TONGUE BASE;  Surgeon: Brent Silva M.D.;  Location: SURGERY SAME DAY [facility];  Service:    • THYROIDECTOMY  9/28/2012    Performed by Brent Silva M.D. 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 Body mass index is 29.49 kg/m².    Hearing good.    Dentition good  Alert, oriented in no acute distress.  Eye contact is good, speech goal directed, affect " calm      Assessment and Plan. The following treatment and monitoring plan is recommended:    1. Medicare annual wellness visit, subsequent     2. Postoperative hypothyroidism  Chronic problem currently stable based on 4/2018 labs.  Treated with [place] Thyroid.  Followed by PCP monitoring labs annually   3. Multiple thyroid nodules  Chronic issue that has remained stable without treatment.  Last ultrasound completed for monitoring 8/2017 was stable, but due now for follow-up ultrasound for monitoring.  Followed by PCP  US-SOFT TISSUES OF HEAD - NECK   4. Osteopenia of left lower leg  12/2017 bone density revealed osteopenia of left hip with T score -1.4.  She is not on any medication for treatment.  Followed by PCP due for bone density monitoring 12/2019   5. Hyperlipidemia with target LDL less than 130  Chronic problem not controlled with 10-year ASCVD risk 10.8%. She is not on any treatment for this as she has been transitioned from one PCP to another, therefore labs from 4/2018 that showed uncontrolled lipids have not been addressed. Needs statin therapy and f/u lipid panel for monitoring.    6. External hemorrhoid  Chronic issue that remains stable with very occasional and mild symptoms.  Not on treatment.  Followed by PCP.  Monitor for any worsening symptoms   7. Pharyngeal dysphagia  See HPI. Not controlled. Enc neti pot/saline rinses twice daily. Refer back to ENT for laryngoscopy   REFERRAL TO ENT       Services suggested: No services needed at this time  Health Care Screening recommendations as per orders if indicated.  Referrals offered: PT/OT/Nutrition counseling/Behavioral Health/Smoking cessation as per orders if indicated.    Discussion today about general wellness and lifestyle habits:    · Prevent falls and reduce trip hazards; Cautioned about securing or removing rugs.  · Have a working fire alarm and carbon monoxide detector;   · Engage in regular physical activity and social activities        Follow-up: Return in about 3 months (around 5/13/2019).

## 2019-02-13 NOTE — PATIENT INSTRUCTIONS
Fat and Cholesterol Restricted Diet  High levels of fat and cholesterol in your blood may lead to various health problems, such as diseases of the heart, blood vessels, gallbladder, liver, and pancreas. Fats are concentrated sources of energy that come in various forms. Certain types of fat, including saturated fat, may be harmful in excess. Cholesterol is a substance needed by your body in small amounts. Your body makes all the cholesterol it needs. Excess cholesterol comes from the food you eat.  When you have high levels of cholesterol and saturated fat in your blood, health problems can develop because the excess fat and cholesterol will gather along the walls of your blood vessels, causing them to narrow. Choosing the right foods will help you control your intake of fat and cholesterol. This will help keep the levels of these substances in your blood within normal limits and reduce your risk of disease.  WHAT IS MY PLAN?  Your health care provider recommends that you:  · Get no more than __________ % of the total calories in your daily diet from fat.  · Limit your intake of saturated fat to less than ______% of your total calories each day.  · Limit the amount of cholesterol in your diet to less than _________mg per day.  WHAT TYPES OF FAT SHOULD I CHOOSE?  · Choose healthy fats more often. Choose monounsaturated and polyunsaturated fats, such as olive and canola oil, flaxseeds, walnuts, almonds, and seeds.  · Eat more omega-3 fats. Good choices include salmon, mackerel, sardines, tuna, flaxseed oil, and ground flaxseeds. Aim to eat fish at least two times a week.  · Limit saturated fats. Saturated fats are primarily found in animal products, such as meats, butter, and cream. Plant sources of saturated fats include palm oil, palm kernel oil, and coconut oil.  · Avoid foods with partially hydrogenated oils in them. These contain trans fats. Examples of foods that contain trans fats are stick margarine, some tub  "margarines, cookies, crackers, and other baked goods.  WHAT GENERAL GUIDELINES DO I NEED TO FOLLOW?  These guidelines for healthy eating will help you control your intake of fat and cholesterol:  · Check food labels carefully to identify foods with trans fats or high amounts of saturated fat.  · Fill one half of your plate with vegetables and green salads.  · Fill one fourth of your plate with whole grains. Look for the word \"whole\" as the first word in the ingredient list.  · Fill one fourth of your plate with lean protein foods.  · Limit fruit to two servings a day. Choose fruit instead of juice.  · Eat more foods that contain soluble fiber. Examples of foods that contain this type of fiber are apples, broccoli, carrots, beans, peas, and barley. Aim to get 20-30 g of fiber per day.  · Eat more home-cooked food and less restaurant, buffet, and fast food.  · Limit or avoid alcohol.  · Limit foods high in starch and sugar.  · Limit fried foods.  · Cook foods using methods other than frying. Baking, boiling, grilling, and broiling are all great options.  · Lose weight if you are overweight. Losing just 5-10% of your initial body weight can help your overall health and prevent diseases such as diabetes and heart disease.  WHAT FOODS CAN I EAT?  Grains  Whole grains, such as whole wheat or whole grain breads, crackers, cereals, and pasta. Unsweetened oatmeal, bulgur, barley, quinoa, or brown rice. Corn or whole wheat flour tortillas.  Vegetables  Fresh or frozen vegetables (raw, steamed, roasted, or grilled). Green salads.  Fruits  All fresh, canned (in natural juice), or frozen fruits.  Meat and Other Protein Products  Ground beef (85% or leaner), grass-fed beef, or beef trimmed of fat. Skinless chicken or turkey. Ground chicken or turkey. Pork trimmed of fat. All fish and seafood. Eggs. Dried beans, peas, or lentils. Unsalted nuts or seeds. Unsalted canned or dry beans.  Dairy  Low-fat dairy products, such as skim or " 1% milk, 2% or reduced-fat cheeses, low-fat ricotta or cottage cheese, or plain low-fat yogurt.  Fats and Oils  Tub margarines without trans fats. Light or reduced-fat mayonnaise and salad dressings. Avocado. Olive, canola, sesame, or safflower oils. Natural peanut or almond butter (choose ones without added sugar and oil).  The items listed above may not be a complete list of recommended foods or beverages. Contact your dietitian for more options.  WHAT FOODS ARE NOT RECOMMENDED?  Grains  White bread. White pasta. White rice. Cornbread. Bagels, pastries, and croissants. Crackers that contain trans fat.  Vegetables  White potatoes. Corn. Creamed or fried vegetables. Vegetables in a cheese sauce.  Fruits  Dried fruits. Canned fruit in light or heavy syrup. Fruit juice.  Meat and Other Protein Products  Fatty cuts of meat. Ribs, chicken wings, cardoza, sausage, bologna, salami, chitterlings, fatback, hot dogs, bratwurst, and packaged luncheon meats. Liver and organ meats.  Dairy  Whole or 2% milk, cream, half-and-half, and cream cheese. Whole milk cheeses. Whole-fat or sweetened yogurt. Full-fat cheeses. Nondairy creamers and whipped toppings. Processed cheese, cheese spreads, or cheese curds.  Sweets and Desserts  Corn syrup, sugars, honey, and molasses. Candy. Jam and jelly. Syrup. Sweetened cereals. Cookies, pies, cakes, donuts, muffins, and ice cream.  Fats and Oils  Butter, stick margarine, lard, shortening, ghee, or cardoza fat. Coconut, palm kernel, or palm oils.  Beverages  Alcohol. Sweetened drinks (such as sodas, lemonade, and fruit drinks or punches).  The items listed above may not be a complete list of foods and beverages to avoid. Contact your dietitian for more information.     This information is not intended to replace advice given to you by your health care provider. Make sure you discuss any questions you have with your health care provider.     Document Released: 12/18/2006 Document Revised: 01/08/2016  Document Reviewed: 03/18/2015  [employer] Interactive Patient Education ©2016 [employer]

## 2019-02-13 NOTE — ASSESSMENT & PLAN NOTE
Chronic problem currently stable based on 4/2018 labs.  Treated with [place] Thyroid.  Followed by PCP monitoring labs annually

## 2019-02-25 ENCOUNTER — HOSPITAL ENCOUNTER (OUTPATIENT)
Dept: RADIOLOGY | Facility: MEDICAL CENTER | Age: 74
End: 2019-02-25
Attending: NURSE PRACTITIONER
Payer: MEDICARE

## 2019-02-25 DIAGNOSIS — E04.2 MULTIPLE THYROID NODULES: ICD-10-CM

## 2019-02-25 PROCEDURE — 76536 US EXAM OF HEAD AND NECK: CPT

## 2019-03-04 ENCOUNTER — HOSPITAL ENCOUNTER (OUTPATIENT)
Dept: RADIOLOGY | Facility: MEDICAL CENTER | Age: 74
End: 2019-03-04
Attending: EMERGENCY MEDICINE
Payer: MEDICARE

## 2019-03-04 ENCOUNTER — OFFICE VISIT (OUTPATIENT)
Dept: URGENT CARE | Facility: PHYSICIAN GROUP | Age: 74
End: 2019-03-04
Payer: MEDICARE

## 2019-03-04 VITALS
WEIGHT: 146 LBS | RESPIRATION RATE: 16 BRPM | HEART RATE: 90 BPM | SYSTOLIC BLOOD PRESSURE: 110 MMHG | DIASTOLIC BLOOD PRESSURE: 80 MMHG | BODY MASS INDEX: 29.43 KG/M2 | TEMPERATURE: 98.5 F | HEIGHT: 59 IN | OXYGEN SATURATION: 96 %

## 2019-03-04 DIAGNOSIS — R05.9 COUGH: ICD-10-CM

## 2019-03-04 DIAGNOSIS — J98.01 BRONCHOSPASM: ICD-10-CM

## 2019-03-04 DIAGNOSIS — J32.9 RHINOSINUSITIS: ICD-10-CM

## 2019-03-04 PROCEDURE — 99203 OFFICE O/P NEW LOW 30 MIN: CPT | Performed by: EMERGENCY MEDICINE

## 2019-03-04 PROCEDURE — 71046 X-RAY EXAM CHEST 2 VIEWS: CPT

## 2019-03-04 RX ORDER — PREDNISONE 20 MG/1
40 TABLET ORAL DAILY
Qty: 10 TAB | Refills: 0 | Status: SHIPPED | OUTPATIENT
Start: 2019-03-04 | End: 2019-05-21

## 2019-03-04 RX ORDER — AMOXICILLIN AND CLAVULANATE POTASSIUM 875; 125 MG/1; MG/1
1 TABLET, FILM COATED ORAL 2 TIMES DAILY
Qty: 14 TAB | Refills: 0 | Status: SHIPPED | OUTPATIENT
Start: 2019-03-04 | End: 2019-03-11

## 2019-03-04 ASSESSMENT — ENCOUNTER SYMPTOMS
COUGH: 1
VOMITING: 0
MYALGIAS: 0
FEVER: 0
NAUSEA: 0
SORE THROAT: 1
HEMOPTYSIS: 0
WHEEZING: 1
SPUTUM PRODUCTION: 1
SHORTNESS OF BREATH: 0
DIARRHEA: 0
ABDOMINAL PAIN: 0
SINUS PAIN: 0
RHINORRHEA: 1
HEARTBURN: 1

## 2019-03-04 NOTE — PROGRESS NOTES
Subjective:      Cait Steven is a 73 y.o. female who presents with URI (x 10 days)            Cough   This is a new problem. Episode onset: 10 days. The problem has been gradually worsening. The problem occurs every few minutes. The cough is productive of sputum. Associated symptoms include chest pain, heartburn, nasal congestion, postnasal drip, rhinorrhea, a sore throat and wheezing. Pertinent negatives include no ear pain, fever, hemoptysis, myalgias, rash or shortness of breath. The symptoms are aggravated by lying down. She has tried OTC cough suppressant for the symptoms. The treatment provided no relief. Her past medical history is significant for bronchitis. There is no history of environmental allergies.   Notes recurrence of similar episodes previously.    Review of Systems   Constitutional: Negative for fever.   HENT: Positive for congestion, postnasal drip, rhinorrhea and sore throat. Negative for ear pain, nosebleeds and sinus pain.    Respiratory: Positive for cough, sputum production and wheezing. Negative for hemoptysis and shortness of breath.    Cardiovascular: Positive for chest pain.        Lateral chest pain associated with coughing only   Gastrointestinal: Positive for heartburn. Negative for abdominal pain, diarrhea, nausea and vomiting.   Musculoskeletal: Negative for myalgias.   Skin: Negative for rash.   Endo/Heme/Allergies: Negative for environmental allergies.     PMH:  has a past medical history of Anesthesia; Bronchitis; Cold (8/3/16); Dental disorder; H/O partial thyroidectomy (2012); Hyperlipidemia; Hypothyroidism (12/18/2012); Multiple thyroid nodules; Osteopenia; Sosa's node; and Tonsillitis.  MEDS:   Current Outpatient Prescriptions:   •  ARMOUR THYROID 60 MG Tab, TAKE ONE TABLET BY MOUTH ONCE DAILY, Disp: 90 Tab, Rfl: 3  •  coenzyme Q-10 30 MG capsule, Take 60 mg by mouth every day., Disp: , Rfl:   •  Probiotic Product (PROBIOTIC DAILY PO), Take  by mouth every  "day., Disp: , Rfl:   •  ranitidine (ZANTAC) 150 MG Tab, Take 1 Tab by mouth 2 times a day. (Patient not taking: Reported on 3/4/2019), Disp: 60 Tab, Rfl: 2  ALLERGIES:   Allergies   Allergen Reactions   • Levaquin      Headache, Dizzy     SURGHX:   Past Surgical History:   Procedure Laterality Date   • MICROLARYNGOSCOPY N/A 9/9/2016    Procedure: MICROLARYNGOSCOPY - DIRECT;  Surgeon: Brent Silva M.D.;  Location: SURGERY SAME DAY [facility] ORS;  Service:    • VOCAL CORD STRIPPING Right 9/9/2016    Procedure: VOCAL CORD STRIPPING - TRUE;  Surgeon: Brent Silva M.D.;  Location: SURGERY SAME DAY [facility] ORS;  Service:    • LESION EXCISION GENERAL Left 9/9/2016    Procedure: LESION EXCISION GENERAL - TONGUE BASE;  Surgeon: Brent Silva M.D.;  Location: SURGERY SAME DAY [facility] ORS;  Service:    • THYROIDECTOMY  9/28/2012    Performed by rBent Silva M.D. 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 Her smoking use included Cigarettes. She has a 13.00 pack-year smoking history. She has never used smokeless tobacco. She reports that she drinks about 0.6 oz of alcohol per week . She reports that she does not use drugs.  FH: family history includes Alcohol/Drug in her brother and father; Arthritis in her father; Cancer in her brother; Heart Disease in her mother; Heart Failure in her mother; Hypertension in her mother; Psychiatry in her brother; Stroke in her father.       Objective:     /80   Pulse 90   Temp 36.9 °C (98.5 °F) (Temporal)   Resp 16   Ht 1.499 m (4' 11\")   Wt 66.2 kg (146 lb)   LMP 01/01/2001   SpO2 96%   BMI 29.49 kg/m²      Physical Exam   Constitutional: She is oriented to person, place, and time. Vital signs are normal. She appears well-developed and well-nourished. She is cooperative.  Non-toxic appearance. No " distress.   HENT:   Head: Normocephalic.   Right Ear: Tympanic membrane and ear canal normal.   Left Ear: Tympanic membrane and ear canal normal.   Nose: Mucosal edema and rhinorrhea present.   Mouth/Throat: Mucous membranes are normal. No trismus in the jaw. No uvula swelling. No oropharyngeal exudate, posterior oropharyngeal edema or posterior oropharyngeal erythema.   Eyes: Conjunctivae and lids are normal.   Neck: Neck supple. No JVD present. Tracheal tenderness present. No tracheal deviation present. No thyroid mass present.   Cardiovascular: Normal rate, regular rhythm and normal heart sounds.    No murmur heard.  No significant pedal edema.   Pulmonary/Chest: No tachypnea. No respiratory distress. She has wheezes. She has rhonchi. She has no rales. She exhibits tenderness.   Abdominal: Soft. She exhibits no distension. There is no tenderness.   Lymphadenopathy:     She has no cervical adenopathy.   Neurological: She is alert and oriented to person, place, and time.   Skin: Skin is warm and dry.   Psychiatric: She has a normal mood and affect.               Assessment/Plan:     1. Bronchospasm  - Albuterol Sulfate 108 (90 Base) MCG/ACT AEROSOL POWDER, BREATH ACTIVATED; Inhale 1-2 Puffs by mouth every 6 hours as needed (coughing, wheezing).  Dispense: 1 Each; Refill: 0  - predniSONE (DELTASONE) 20 MG Tab; Take 2 Tabs by mouth every day.  Dispense: 10 Tab; Refill: 0    2. Rhinosinusitis  Recommended supportive care measures, including rest, increasing oral fluid intake and use of over-the-counter medications for relief of symptoms.  Based on duration:  - amoxicillin-clavulanate (AUGMENTIN) 875-125 MG Tab; Take 1 Tab by mouth 2 times a day for 7 days.  Dispense: 14 Tab; Refill: 0  REF PCP    3. Cough  - DX-CHEST-2 VIEWS; per radiologist:  No acute cardiopulmonary process is identified.  Atherosclerotic plaque.

## 2019-05-01 ENCOUNTER — TELEPHONE (OUTPATIENT)
Dept: MEDICAL GROUP | Facility: PHYSICIAN GROUP | Age: 74
End: 2019-05-01

## 2019-05-01 NOTE — TELEPHONE ENCOUNTER
Future Appointments       Provider Department Center    5/3/2019 12:30 PM [facility]     5/13/2019 9:40 AM GRISEL Diaz [facility]        ESTABLISHED PATIENT PRE-VISIT PLANNING     Patient was NOT contacted to complete PVP.     Note: Patient will not be contacted if there is no indication to call.     1.  Reviewed notes from the last few office visits within the medical group: Yes    2.  If any orders were placed at last visit or intended to be done for this visit (i.e. 6 mos follow-up), do we have Results/Consult Notes?        •  Labs - Labs ordered, but not to be completed until 5/1/19.   Note: If patient appointment is for lab review and patient did not complete labs, check with provider if OK to reschedule patient until labs completed.       •  Imaging - Imaging was not ordered at last office visit.       •  Referrals - Referral ordered, patient has NOT been seen.    3. Is this appointment scheduled as a Hospital Follow-Up? No    4.  Immunizations were updated in [facility] using WebIZ?: Yes       •  Web Iz Recommendations: FLU, TD and SHINGRIX (Shingles)    5.  Patient is due for the following Health Maintenance Topics:   Health Maintenance Due   Topic Date Due   • IMM ZOSTER VACCINES (1 of 2) 03/15/1997   • MAMMOGRAM  04/25/2019       6. Orders for overdue Health Maintenance topics pended in Pre-Charting? N\A    7.  AHA (MDX) form printed for Provider? YES    8.  Patient was NOT informed to arrive 15 min prior to their scheduled appointment and bring in their medication bottles.

## 2019-05-03 ENCOUNTER — HOSPITAL ENCOUNTER (OUTPATIENT)
Dept: LAB | Facility: MEDICAL CENTER | Age: 74
End: 2019-05-03
Attending: NURSE PRACTITIONER
Payer: MEDICARE

## 2019-05-03 DIAGNOSIS — E89.0 POSTOPERATIVE HYPOTHYROIDISM: ICD-10-CM

## 2019-05-03 DIAGNOSIS — E78.5 HYPERLIPIDEMIA WITH TARGET LDL LESS THAN 130: ICD-10-CM

## 2019-05-03 DIAGNOSIS — M85.862 OSTEOPENIA OF LEFT LOWER LEG: ICD-10-CM

## 2019-05-03 DIAGNOSIS — Z00.00 PREVENTATIVE HEALTH CARE: ICD-10-CM

## 2019-05-03 DIAGNOSIS — E55.9 VITAMIN D DEFICIENCY DISEASE: ICD-10-CM

## 2019-05-03 LAB
25(OH)D3 SERPL-MCNC: 32 NG/ML (ref 30–100)
ALBUMIN SERPL BCP-MCNC: 4 G/DL (ref 3.2–4.9)
ALBUMIN/GLOB SERPL: 1.4 G/DL
ALP SERPL-CCNC: 93 U/L (ref 30–99)
ALT SERPL-CCNC: 11 U/L (ref 2–50)
ANION GAP SERPL CALC-SCNC: 10 MMOL/L (ref 0–11.9)
AST SERPL-CCNC: 17 U/L (ref 12–45)
BASOPHILS # BLD AUTO: 0.8 % (ref 0–1.8)
BASOPHILS # BLD: 0.05 K/UL (ref 0–0.12)
BILIRUB SERPL-MCNC: 0.4 MG/DL (ref 0.1–1.5)
BUN SERPL-MCNC: 16 MG/DL (ref 8–22)
CALCIUM SERPL-MCNC: 9.4 MG/DL (ref 8.5–10.5)
CHLORIDE SERPL-SCNC: 98 MMOL/L (ref 96–112)
CHOLEST SERPL-MCNC: 216 MG/DL (ref 100–199)
CO2 SERPL-SCNC: 25 MMOL/L (ref 20–33)
CREAT SERPL-MCNC: 0.87 MG/DL (ref 0.5–1.4)
EOSINOPHIL # BLD AUTO: 0.05 K/UL (ref 0–0.51)
EOSINOPHIL NFR BLD: 0.8 % (ref 0–6.9)
ERYTHROCYTE [DISTWIDTH] IN BLOOD BY AUTOMATED COUNT: 47 FL (ref 35.9–50)
FASTING STATUS PATIENT QL REPORTED: NORMAL
GLOBULIN SER CALC-MCNC: 2.8 G/DL (ref 1.9–3.5)
GLUCOSE SERPL-MCNC: 91 MG/DL (ref 65–99)
HCT VFR BLD AUTO: 40.9 % (ref 37–47)
HDLC SERPL-MCNC: 42 MG/DL
HGB BLD-MCNC: 12.9 G/DL (ref 12–16)
IMM GRANULOCYTES # BLD AUTO: 0.02 K/UL (ref 0–0.11)
IMM GRANULOCYTES NFR BLD AUTO: 0.3 % (ref 0–0.9)
LDLC SERPL CALC-MCNC: 149 MG/DL
LYMPHOCYTES # BLD AUTO: 1.77 K/UL (ref 1–4.8)
LYMPHOCYTES NFR BLD: 29.6 % (ref 22–41)
MCH RBC QN AUTO: 30.4 PG (ref 27–33)
MCHC RBC AUTO-ENTMCNC: 31.5 G/DL (ref 33.6–35)
MCV RBC AUTO: 96.2 FL (ref 81.4–97.8)
MONOCYTES # BLD AUTO: 0.61 K/UL (ref 0–0.85)
MONOCYTES NFR BLD AUTO: 10.2 % (ref 0–13.4)
NEUTROPHILS # BLD AUTO: 3.48 K/UL (ref 2–7.15)
NEUTROPHILS NFR BLD: 58.3 % (ref 44–72)
NRBC # BLD AUTO: 0 K/UL
NRBC BLD-RTO: 0 /100 WBC
PLATELET # BLD AUTO: 354 K/UL (ref 164–446)
PMV BLD AUTO: 9.4 FL (ref 9–12.9)
POTASSIUM SERPL-SCNC: 4.5 MMOL/L (ref 3.6–5.5)
PROT SERPL-MCNC: 6.8 G/DL (ref 6–8.2)
RBC # BLD AUTO: 4.25 M/UL (ref 4.2–5.4)
SODIUM SERPL-SCNC: 133 MMOL/L (ref 135–145)
T3 SERPL-MCNC: 122.1 NG/DL (ref 60–181)
T4 FREE SERPL-MCNC: 0.77 NG/DL (ref 0.53–1.43)
TRIGL SERPL-MCNC: 125 MG/DL (ref 0–149)
TSH SERPL DL<=0.005 MIU/L-ACNC: 0.34 UIU/ML (ref 0.38–5.33)
WBC # BLD AUTO: 6 K/UL (ref 4.8–10.8)

## 2019-05-03 PROCEDURE — 80061 LIPID PANEL: CPT

## 2019-05-03 PROCEDURE — 84439 ASSAY OF FREE THYROXINE: CPT

## 2019-05-03 PROCEDURE — 80053 COMPREHEN METABOLIC PANEL: CPT

## 2019-05-03 PROCEDURE — 84443 ASSAY THYROID STIM HORMONE: CPT

## 2019-05-03 PROCEDURE — 36415 COLL VENOUS BLD VENIPUNCTURE: CPT

## 2019-05-03 PROCEDURE — 84480 ASSAY TRIIODOTHYRONINE (T3): CPT

## 2019-05-03 PROCEDURE — 82306 VITAMIN D 25 HYDROXY: CPT

## 2019-05-03 PROCEDURE — 85025 COMPLETE CBC W/AUTO DIFF WBC: CPT

## 2019-05-21 ENCOUNTER — OFFICE VISIT (OUTPATIENT)
Dept: MEDICAL GROUP | Facility: PHYSICIAN GROUP | Age: 74
End: 2019-05-21
Payer: MEDICARE

## 2019-05-21 VITALS
HEIGHT: 59 IN | WEIGHT: 143 LBS | OXYGEN SATURATION: 94 % | SYSTOLIC BLOOD PRESSURE: 126 MMHG | BODY MASS INDEX: 28.83 KG/M2 | DIASTOLIC BLOOD PRESSURE: 82 MMHG | HEART RATE: 83 BPM | TEMPERATURE: 97.5 F

## 2019-05-21 DIAGNOSIS — R13.13 PHARYNGEAL DYSPHAGIA: ICD-10-CM

## 2019-05-21 DIAGNOSIS — Z12.31 VISIT FOR SCREENING MAMMOGRAM: ICD-10-CM

## 2019-05-21 DIAGNOSIS — E78.5 HYPERLIPIDEMIA WITH TARGET LDL LESS THAN 130: ICD-10-CM

## 2019-05-21 DIAGNOSIS — E89.0 POSTOPERATIVE HYPOTHYROIDISM: ICD-10-CM

## 2019-05-21 PROCEDURE — 99213 OFFICE O/P EST LOW 20 MIN: CPT | Performed by: NURSE PRACTITIONER

## 2019-05-21 RX ORDER — THYROID 60 MG
TABLET ORAL
Qty: 90 TAB | Refills: 3 | Status: SHIPPED | OUTPATIENT
Start: 2019-05-21 | End: 2019-10-01 | Stop reason: SDUPTHER

## 2019-05-21 NOTE — ASSESSMENT & PLAN NOTE
LDL has improved with res yeast rice. 10-year ASCVD risk now 9.3% down from 10.8%.    Ref. Range 10/15/2015 09:33 2/29/2016 06:55 3/7/2017 11:43 4/16/2018 08:30 5/3/2019 12:35   Cholesterol,Tot Latest Ref Range: 100 - 199 mg/dL 265 (H) 223 (H) 250 (H) 248 (H) 216 (H)   Triglycerides Latest Ref Range: 0 - 149 mg/dL 150 (H) 107 139 159 (H) 125   HDL Latest Ref Range: >=40 mg/dL 46 46 48 46 42   LDL Latest Ref Range: <100 mg/dL 189 (H) 156 (H) 174 (H) 170 (H) 149 (H)

## 2019-05-21 NOTE — LETTER
[facility]  GRISEL Diaz  [address] [address]  Fax: [phone]   Authorization for Release/Disclosure of   Protected Health Information   Name: CAIT OSBORNE : 1945 SSN: [id number]   Address: [address] [place] [id number] Phone:    [phone] (home)    I authorize the entity listed below to release/disclose the PHI below to:   [facility]/GRISEL Diaz and GRISEL Diaz   Provider or Entity Name:  Dr. Ricardo CORNEJO    Address   City, [facility], [facility]   Phone:      Fax:     Reason for request: continuity of care   Information to be released:    [  ] LAST COLONOSCOPY,  including any PATH REPORT and follow-up  [  ] LAST FIT/COLOGUARD RESULT [  ] LAST DEXA  [  ] LAST MAMMOGRAM  [  ] LAST PAP  [  ] LAST LABS [  ] RETINA EXAM REPORT  [  ] IMMUNIZATION RECORDS  [xx  ] Release all info      [  ] Check here and initial the line next to each item to release ALL health information INCLUDING  _____ Care and treatment for drug and / or alcohol abuse  _____ HIV testing, infection status, or AIDS  _____ Genetic Testing    DATES OF SERVICE OR TIME PERIOD TO BE DISCLOSED: _____________  I understand and acknowledge that:  * This Authorization may be revoked at any time by you in writing, except if your health information has already been used or disclosed.  * Your health information that will be used or disclosed as a result of you signing this authorization could be re-disclosed by the recipient. If this occurs, your re-disclosed health information may no longer be protected by State or Federal laws.  * You may refuse to sign this Authorization. Your refusal will not affect your ability to obtain treatment.  * This Authorization becomes effective upon signing and will  on (date) __________.      If no date is indicated, this Authorization will  one (1) year from the signature date.    Name: Cait Osborne    Signature:   Date:      5/21/2019       PLEASE FAX REQUESTED RECORDS BACK TO: [phone]

## 2019-05-21 NOTE — PROGRESS NOTES
Subjective:     Chief Complaint   Patient presents with   • Medication Management     med fv/med refills as needed   • Labs Only     lab review       HPI  Cait Steven is a 74 y.o. female here today for lab review. All labs are normal and then the direct conditions and labs associated are listed below.     Postoperative hypothyroidism  On Lane Thyroid. TSH low, but T4 low normal and T3 stable.    Ref. Range 5/3/2019 12:35   TSH Latest Ref Range: 0.380 - 5.330 uIU/mL 0.340 (L)   Free T-4 Latest Ref Range: 0.53 - 1.43 ng/dL 0.77   T3 Latest Ref Range: 60.0 - 181.0 ng/dL 122.1       Hyperlipidemia with target LDL less than 130  LDL has improved with res yeast rice. 10-year ASCVD risk now 9.3% down from 10.8%.    Ref. Range 10/15/2015 09:33 2/29/2016 06:55 3/7/2017 11:43 4/16/2018 08:30 5/3/2019 12:35   Cholesterol,Tot Latest Ref Range: 100 - 199 mg/dL 265 (H) 223 (H) 250 (H) 248 (H) 216 (H)   Triglycerides Latest Ref Range: 0 - 149 mg/dL 150 (H) 107 139 159 (H) 125   HDL Latest Ref Range: >=40 mg/dL 46 46 48 46 42   LDL Latest Ref Range: <100 mg/dL 189 (H) 156 (H) 174 (H) 170 (H) 149 (H)       Pharyngeal dysphagia  She had laryngoscopy with Dr. Silva ENT, which she informs me was normal. She did have x-ray of cervical spine that was normal as well. She has had ongoing mucus production in her throat since she had partial thyroidectomy from goiter with Dr. Silva.        Diagnoses of Postoperative hypothyroidism, Hyperlipidemia with target LDL less than 130, Pharyngeal dysphagia, and Visit for screening mammogram were pertinent to this visit.    Allergies: Levaquin  Current medicines (including changes today)  Current Outpatient Prescriptions   Medication Sig Dispense Refill   • Red Yeast Rice Extract (RED YEAST RICE PO) Take  by mouth.     • ARMOUR THYROID 60 MG Tab TAKE ONE TABLET BY MOUTH ONCE DAILY 90 Tab 3   • coenzyme Q-10 30 MG capsule Take 60 mg by mouth every day.     • Probiotic Product  "(PROBIOTIC DAILY PO) Take  by mouth every day.     • ranitidine (ZANTAC) 150 MG Tab Take 1 Tab by mouth 2 times a day. (Patient not taking: Reported on 3/4/2019) 60 Tab 2     No current facility-administered medications for this visit.        She  has a past medical history of Anesthesia; Bronchitis; Cold (8/3/16); Dental disorder; H/O partial thyroidectomy (2012); Hyperlipidemia; Hypothyroidism (12/18/2012); Multiple thyroid nodules; Osteopenia; Sosa's node; and Tonsillitis.        ROS  As stated in HPI   HEENT: +mucus in throat with pharyngeal dysphagia. No sore throat, seasonal allergies, sinus congestion  GI: No hoarseness, globus sensation      Objective:     /82 (BP Location: Left arm, Patient Position: Sitting)   Pulse 83   Temp 36.4 °C (97.5 °F) (Temporal)   Ht 1.499 m (4' 11\")   Wt 64.9 kg (143 lb)   SpO2 94%  Body mass index is 28.88 kg/m².  Physical Exam:  General: Alert, oriented, in no acute distress.  Eye contact is good, speech goal directed, affect calm  CNs grossly intact.  HEENT: conjunctiva non-injected, sclera non-icteric, EOMs intact. No lid edema or eye drainage.   Gross hearing intact.  Ext: no edema, normal color   Skin: No rashes or lesions in visible areas  Gait steady.     Assessment and Plan:   Assessment/Plan:  1. Postoperative hypothyroidism  Stable on [place] Thyroid    2. Hyperlipidemia with target LDL less than 130  Improving. Still above 7.5% risk. Will defer to her and her next PCP to determine whether statin therapy is initiated     3. Pharyngeal dysphagia  Request records from ENT    4. Visit for screening mammogram  - MA-SCREEN MAMMO W/CAD-BILAT; Future       Follow up:  Return to establish .    Educated in proper administration of medication(s) ordered today including safety, possible SE, risks, benefits, rationale and alternatives to therapy.   Supportive care, differential diagnoses, and indications for immediate follow-up discussed with patient.    Pathogenesis " of diagnosis discussed including typical length and natural progression.    Instructed to return to clinic or nearest emergency department for any change in condition, further concerns, or worsening of symptoms.  Patient states understanding of the plan of care and discharge instructions.      Please note that this dictation was created using voice recognition software. I have made every reasonable attempt to correct obvious errors, but I expect that there are errors of grammar and possibly content that I did not discover before finalizing the note.    Followup: Return to establish . sooner should new symptoms or problems arise.

## 2019-05-21 NOTE — ASSESSMENT & PLAN NOTE
She had laryngoscopy with Dr. Silva ENT, which she informs me was normal. She did have x-ray of cervical spine that was normal as well. She has had ongoing mucus production in her throat since she had partial thyroidectomy from goiter with Dr. Silva.

## 2019-05-21 NOTE — ASSESSMENT & PLAN NOTE
On [place] Thyroid. TSH low, but T4 low normal and T3 stable.    Ref. Range 5/3/2019 12:35   TSH Latest Ref Range: 0.380 - 5.330 uIU/mL 0.340 (L)   Free T-4 Latest Ref Range: 0.53 - 1.43 ng/dL 0.77   T3 Latest Ref Range: 60.0 - 181.0 ng/dL 122.1

## 2019-06-03 ENCOUNTER — APPOINTMENT (OUTPATIENT)
Dept: RADIOLOGY | Facility: MEDICAL CENTER | Age: 74
End: 2019-06-03
Attending: NURSE PRACTITIONER
Payer: MEDICARE

## 2019-06-13 ENCOUNTER — HOSPITAL ENCOUNTER (OUTPATIENT)
Dept: RADIOLOGY | Facility: MEDICAL CENTER | Age: 74
End: 2019-06-13
Attending: NURSE PRACTITIONER
Payer: MEDICARE

## 2019-06-13 DIAGNOSIS — Z12.31 VISIT FOR SCREENING MAMMOGRAM: ICD-10-CM

## 2019-06-13 PROCEDURE — 77063 BREAST TOMOSYNTHESIS BI: CPT

## 2019-10-01 ENCOUNTER — OFFICE VISIT (OUTPATIENT)
Dept: MEDICAL GROUP | Facility: PHYSICIAN GROUP | Age: 74
End: 2019-10-01
Payer: MEDICARE

## 2019-10-01 VITALS
HEART RATE: 73 BPM | TEMPERATURE: 97.8 F | HEIGHT: 59 IN | OXYGEN SATURATION: 96 % | BODY MASS INDEX: 30.04 KG/M2 | WEIGHT: 149 LBS | SYSTOLIC BLOOD PRESSURE: 118 MMHG | DIASTOLIC BLOOD PRESSURE: 62 MMHG

## 2019-10-01 DIAGNOSIS — E78.5 HYPERLIPIDEMIA WITH TARGET LDL LESS THAN 130: ICD-10-CM

## 2019-10-01 DIAGNOSIS — E89.0 POSTOPERATIVE HYPOTHYROIDISM: ICD-10-CM

## 2019-10-01 DIAGNOSIS — E66.9 OBESITY (BMI 30-39.9): ICD-10-CM

## 2019-10-01 PROCEDURE — 99214 OFFICE O/P EST MOD 30 MIN: CPT | Performed by: FAMILY MEDICINE

## 2019-10-01 PROCEDURE — 8041 PR SCP AHA: Performed by: FAMILY MEDICINE

## 2019-10-01 RX ORDER — THYROID 60 MG
TABLET ORAL
Qty: 30 TAB | Refills: 0 | Status: SHIPPED | OUTPATIENT
Start: 2019-10-01 | End: 2020-01-28 | Stop reason: SDUPTHER

## 2019-10-01 RX ORDER — THYROID 60 MG
TABLET ORAL
Qty: 90 TAB | Refills: 3 | Status: SHIPPED | OUTPATIENT
Start: 2019-10-01 | End: 2019-10-01 | Stop reason: SDUPTHER

## 2019-10-01 ASSESSMENT — PAIN SCALES - GENERAL: PAINLEVEL: NO PAIN

## 2019-10-01 NOTE — ASSESSMENT & PLAN NOTE
Chronic ongoing medical condition.  Currently taking Pool Thyroid 60 mg once daily.  Denies  any heat or cold intolerance, skin/hair/nail changes, fatigue, tremors, weight loss or weight gain.

## 2019-10-01 NOTE — PROGRESS NOTES
Subjective:     Cait Steven is a 74 y.o. female here today for establishing new PCP and Annual Health Assessment.    Hyperlipidemia with target LDL less than 130  Chronic ongoing medical condition.  Patient currently has mildly elevated LDL cholesterol, normal HDL, and mildly elevated total cholesterol.  Not currently taking any cholesterol lowering medications.  She is taking red yeast rice as a supplement to lower her cholesterol.  Currently working on dietary modification to maintain appropriate cholesterol levels.  Denies history of MI or stroke.    Postoperative hypothyroidism  Chronic ongoing medical condition.  Currently taking [place] Thyroid 60 mg once daily.  Denies  any heat or cold intolerance, skin/hair/nail changes, fatigue, tremors, weight loss or weight gain.    Obesity (BMI 30-39.9)  Chronic ongoing medical condition.  BMI today 30.09.  Patient states that she actively avoids refined carbohydrates and added sugars.  Exercise is moderate.     Health Maintenance Summary                HEPATITIS C SCREENING Overdue 1945     IMM ZOSTER VACCINES Postponed 3/1/2020 Originally 3/15/1997. System: vaccine not available, other system reasons    IMM INFLUENZA Postponed 10/1/2020 Originally 9/1/2019. Patient Refused     Patient Declined 10/12/2015     Annual Wellness Visit Next Due 2/14/2020      Done 2/13/2019 Visit Dx: Medicare annual wellness visit, subsequent     Patient has more history with this topic...    MAMMOGRAM Next Due 6/13/2020      Done 6/13/2019 MA-SCREENING MAMMO BILAT W/TOMOSYNTHESIS W/CAD     Patient has more history with this topic...    BONE DENSITY Next Due 12/5/2020      Done 12/5/2017 DS-BONE DENSITY STUDY (DEXA)     Patient has more history with this topic...    IMM DTaP/Tdap/Td Vaccine Next Due 6/8/2022      Done 6/8/2012 Imm Admin: Tdap Vaccine    COLONOSCOPY Next Due 9/17/2022      Not specified 9/17/2012      Patient has more history with this topic...        "    Annual Health Assessment Questions:     1.  Are you currently engaging in any exercise or physical activity? Yes    2.  How would you describe your mood or emotional well-being today? good    3.  Have you had any falls in the last year? No    4.  Have you noticed any problems with your balance or had difficulty walking? Yes    5.  In the last six months have you experienced any leakage of urine? No    6. DPA/Advanced Directive: Patient has Advanced Directive, but it is not on file. Instructed to bring in a copy to scan into their chart.    Current medicines (including changes today)  Current Outpatient Medications   Medication Sig Dispense Refill   • ARMOUR THYROID 60 MG Tab TAKE ONE TABLET BY MOUTH ONCE DAILY 30 Tab 0   • Red Yeast Rice Extract (RED YEAST RICE PO) Take  by mouth.     • coenzyme Q-10 30 MG capsule Take 60 mg by mouth every day.     • Probiotic Product (PROBIOTIC DAILY PO) Take  by mouth every day.       No current facility-administered medications for this visit.        She  has a past medical history of Anesthesia, Bronchitis, Cold (8/3/16), Dental disorder, H/O partial thyroidectomy (2012), Hyperlipidemia, Hypothyroidism (12/18/2012), Multiple thyroid nodules, Osteopenia, Sosa's node, and Tonsillitis.    Levaquin    She  reports that she quit smoking about 33 years ago. Her smoking use included cigarettes. She has a 13.00 pack-year smoking history. She has never used smokeless tobacco. She reports that she drank about 0.6 oz of alcohol per week. She reports that she does not use drugs.  Counseling given: Yes      ROS   No chest pain, no shortness of breath, no abdominal pain.     Objective:     Physical Exam:  /62 (BP Location: Left arm, Patient Position: Sitting, BP Cuff Size: Adult)   Pulse 73   Temp 36.6 °C (97.8 °F)   Ht 1.499 m (4' 11\")   Wt 67.6 kg (149 lb)   SpO2 96%  Body mass index is 30.09 kg/m².   Constitutional: Alert, no distress.  Skin: Warm, dry, good turgor, no " rashes in visible areas.  Eye: Equal, round and reactive, conjunctiva clear, lids normal.  ENMT: Lips without lesions, good dentition, oropharynx clear.  Neck: Trachea midline, no masses, no thyromegaly. No cervical or supraclavicular lymphadenopathy.  Respiratory: Unlabored respiratory effort, lungs clear to auscultation, no wheezes, no rhonchi.  Cardiovascular: Normal S1, S2, no murmur, no edema.  Abdomen: Soft, non-tender, no masses, no hepatosplenomegaly.  Psych: Alert and oriented x3, normal affect and mood.    Assessment and Plan:     1. Postoperative hypothyroidism  - ARMOUR THYROID 60 MG Tab; TAKE ONE TABLET BY MOUTH ONCE DAILY  Dispense: 30 Tab; Refill: 0    2.  Obesity  Counseled on diet, exercise, and lifestyle changes.    3.  Dyslipidemia  Chronic ongoing uncontrolled medical condition.  Patient declines cholesterol-lowering medication and continues to elect to continue ready stress.    Discussion today about general wellness and lifestyle habits:    · Engage in regular physical activity and social activities.  · Prevent falls and reduce trip hazards; using ambulatory aides, hearing and vision testing if appropriate.  · Steps to improve urinary incontinence.  · Advanced care planning.    Follow-Up: Return in about 1 year (around 10/1/2020) for Medication management, Labs.         PLEASE NOTE: This dictation was created using voice recognition software. I have made every reasonable attempt to correct obvious errors, but I expect that there are errors of grammar and possibly content that I did not discover before finalizing the note.

## 2019-10-01 NOTE — ASSESSMENT & PLAN NOTE
Chronic ongoing medical condition.  BMI today 30.09.  Patient states that she actively avoids refined carbohydrates and added sugars.  Exercise is moderate.

## 2019-10-01 NOTE — ASSESSMENT & PLAN NOTE
Chronic ongoing medical condition.  Patient currently has mildly elevated LDL cholesterol, normal HDL, and mildly elevated total cholesterol.  Not currently taking any cholesterol lowering medications.  She is taking red yeast rice as a supplement to lower her cholesterol.  Currently working on dietary modification to maintain appropriate cholesterol levels.  Denies history of MI or stroke.

## 2020-01-28 DIAGNOSIS — E89.0 POSTOPERATIVE HYPOTHYROIDISM: ICD-10-CM

## 2020-01-28 RX ORDER — THYROID 60 MG
TABLET ORAL
Qty: 90 TAB | Refills: 4 | Status: SHIPPED | OUTPATIENT
Start: 2020-01-28

## 2020-02-28 ENCOUNTER — TELEPHONE (OUTPATIENT)
Dept: MEDICAL GROUP | Facility: PHYSICIAN GROUP | Age: 75
End: 2020-02-28

## 2020-02-28 NOTE — TELEPHONE ENCOUNTER
Patient stopped by the office and states she has changed insurance to [employer] and had to find a new doctor since her new insurance is not contracted with [facility] or Dr. Mcconnell. Eff 3/15/20 she will no longer be a patient of Dr Mcconnell's and has arranged to have her medical records transferred to [facility] Stevenson [address] [place] [address] b, [place] [address].

## 2020-09-23 ENCOUNTER — TELEPHONE (OUTPATIENT)
Dept: MEDICAL GROUP | Facility: PHYSICIAN GROUP | Age: 75
End: 2020-09-23

## 2020-09-23 NOTE — TELEPHONE ENCOUNTER
Future Appointments       Provider Department Center    10/1/2020 1:00 PM Edy Mcconnell M.D.; [facility]      ANNUAL WELLNESS VISIT PRE-VISIT PLANNING WITHOUT OUTREACH    1.  Reviewed note from last office visit with PCP: YES, 10/01/2019    2.  If any orders were placed at last visit, do we have Results/Consult Notes?        •  Labs - Labs were not ordered at last office visit.       •  Imaging - Imaging was not ordered at last office visit.       •  Referrals - No referrals were ordered at last office visit.    3.  Immunizations were updated in [facility] using WebIZ?: Yes       •  WebIZ Recommendations: FLU, TD, SHINGRIX (Shingles) and cpox        •  Is patient due for Tdap? NO       •  Is patient due for Shingrix? NO     4.  Patient is due for the following Health Maintenance Topics:   Health Maintenance Due   Topic Date Due   • HEPATITIS C SCREENING  1945   • IMM ZOSTER VACCINES (1 of 2) 03/15/1997   • Annual Wellness Visit  02/14/2020   • MAMMOGRAM  06/13/2020   • IMM INFLUENZA (1) 09/01/2020     5.  Reviewed/Updated the following with patient:       •   Preferred Pharmacy? YES       •   Preferred Lab? YES       •   Preferred Communication? YES       •   Allergies? NO       •   Medications? NO       •   Social History? NO       •   Family History (document living status of immediate family members and if + hx of  cancer, diabetes, hypertension, hyperlipidemia, heart attack, stroke) NO    6.  Care Team Updated:       •   DME Company (gait device, O2, CPAP, etc.): NO       •   Other Specialists (eye doctor, derm, GYN, cardiology, endo, etc): NO    7. Orders for overdue Health Maintenance topics pended in Pre-Charting? No      8.  Patient has the following Care Path diagnoses on Problem List:  NONE    9.  Patient was not advised: “This is a free wellness visit. The provider will screen for medical conditions to help you stay healthy. If you have other concerns to address  you may be asked to discuss these at a separate visit or there may be an additional fee.”     10.  Patient was NOT informed to arrive 15 min prior to their scheduled appointment and bring in their medication bottles.    Pt is no longer with [facility] and has a different provider

## 2021-01-15 DIAGNOSIS — Z23 NEED FOR VACCINATION: ICD-10-CM
